# Patient Record
Sex: MALE | Race: WHITE | NOT HISPANIC OR LATINO | Employment: FULL TIME | URBAN - METROPOLITAN AREA
[De-identification: names, ages, dates, MRNs, and addresses within clinical notes are randomized per-mention and may not be internally consistent; named-entity substitution may affect disease eponyms.]

---

## 2017-04-11 ENCOUNTER — OFFICE VISIT (OUTPATIENT)
Dept: FAMILY MEDICINE | Facility: CLINIC | Age: 27
End: 2017-04-11

## 2017-04-11 VITALS
DIASTOLIC BLOOD PRESSURE: 71 MMHG | WEIGHT: 160 LBS | TEMPERATURE: 98.2 F | HEIGHT: 69 IN | BODY MASS INDEX: 23.7 KG/M2 | HEART RATE: 76 BPM | SYSTOLIC BLOOD PRESSURE: 129 MMHG | OXYGEN SATURATION: 98 %

## 2017-04-11 DIAGNOSIS — J30.1 SEASONAL ALLERGIC RHINITIS DUE TO POLLEN: Primary | ICD-10-CM

## 2017-04-11 DIAGNOSIS — J45.990 EXERCISE-INDUCED ASTHMA: ICD-10-CM

## 2017-04-11 PROBLEM — F19.11 SUBSTANCE ABUSE IN REMISSION (H): Status: ACTIVE | Noted: 2017-04-11

## 2017-04-11 RX ORDER — FLUTICASONE PROPIONATE 50 MCG
2 SPRAY, SUSPENSION (ML) NASAL DAILY
Qty: 16 G | Refills: 1 | Status: SHIPPED | OUTPATIENT
Start: 2017-04-11

## 2017-04-11 RX ORDER — ALBUTEROL SULFATE 90 UG/1
2 AEROSOL, METERED RESPIRATORY (INHALATION) EVERY 4 HOURS PRN
Qty: 1 INHALER | Refills: 1
Start: 2017-04-11 | End: 2024-04-01

## 2017-04-11 RX ORDER — LORATADINE 10 MG/1
10 TABLET ORAL DAILY
Qty: 90 TABLET | Refills: 1 | Status: SHIPPED | OUTPATIENT
Start: 2017-04-11 | End: 2024-04-01

## 2017-04-11 ASSESSMENT — ENCOUNTER SYMPTOMS
FATIGUE: 1
ACTIVITY CHANGE: 0
CHILLS: 0
EYE DISCHARGE: 0
APPETITE CHANGE: 0
SHORTNESS OF BREATH: 0
EYE ITCHING: 1
COUGH: 1
FEVER: 0
SORE THROAT: 1
RHINORRHEA: 1
SINUS PRESSURE: 1
CHEST TIGHTNESS: 0

## 2017-04-11 ASSESSMENT — PAIN SCALES - GENERAL: PAINLEVEL: NO PAIN (0)

## 2017-04-11 NOTE — PATIENT INSTRUCTIONS
Understanding Nasal Allergies  Nasal allergies (also called allergic rhinitis) are a common health problem. They may be seasonal.This means they cause symptoms only at certain times of the year. Or they may be perennial.This means they cause symptoms all year long. Other health problems, such as asthma, often occur along with allergies as well.    What Is an allergic reaction?  An allergy is a reaction to a substance called an allergen. Common allergens include:    Wind-borne pollen    Mold    Dust mites    Furry and feathered animals    Cockroaches  Normally, allergens are harmless. But when a person has allergies, their body thinks they are harmful. Their body then attacks allergens with antibodies. Antibodies are attached to special cells called mast cells. Allergens stick to the antibodies. This makes the mast cells release histamine and other chemicals. This is an allergic reaction. The chemicals irritate nearby nasal tissue. This causes nasal allergy symptoms.  Common nasal allergy symptoms  Allergies can cause nasal tissue to swell. This makes the air passages smaller. The nose may feel stuffed up. The nose may also make extra mucus, which can plug the nasal passages or drip out of the nose. Mucus can drip down the back of the throat (postnasal drip) as well. Sinus tissue can swell. This may cause pain and headache. Common allergy symptoms include:    Runny nose with clear, watery discharge    Stuffy nose (nasal congestion)    Drainage down your throat (postnasal drip)    Sneezing    Red, watery eyes    Itchy nose, eyes, ears, and throat    Plugged-up ears (ear congestion)    Sore throat    Coughing    Sinus pain and swelling    Headache  It may not be allergies  Other health problems can cause symptoms like those of nasal allergies. These include:    Nonallergic rhinitis and viruses such as colds    Irritants and pollutants, such as strong odors or smoke    Certain medications    Changes in the weather    Treatment  Your health care provider will evaluate you to find the cause of your symptoms then recommend treatment. You may also be referred to an allergist.     3339-5401 [employer]. [address] [place] [address], [place] [address]. All rights reserved. This information is not intended as a substitute for professional medical care. Always follow your healthcare professional's instructions.        Controlling Asthma Triggers: Allergens  For many people with lung problems such as asthma or COPD, inhaling allergens leads to inflamed airways. Allergens also cause other types of reactions in some people. For example, a runny nose, itchy, watery eyes, or a skin rash. Do your best to avoid allergens that trigger symptoms. The tips below can help to lessen any reaction you may have to certain allergens.     Wash bedding in hot water (130 F) each week.    Dust Mites  Dust mites are tiny bugs too small to see or feel. But they can be a major trigger for allergy and asthma symptoms. Dust mites live in mattresses, bedding, carpets, curtains, and indoor dust. They thrive in warm, moist environments.    Wash bedding in hot water (130 F) each week. This kills the dust mites.    Cover mattress and pillows with special dust-mite-proof (hypoallergenic) cases.    Don t use upholstered furniture (like sofas or chairs) in the bedroom.    Use allergy-proof filters for air conditioners and furnaces. Follow  instructions for maintaining and replacing filters.    If you can, replace [id number] carpets with wood, tile, or linoleum floors--especially in the bedroom.  Animals  Animals with fur or feathers often produce allergens. These are shed as tiny particles called dander. Dander can float through the air or stick to carpet, clothing, and furniture.    It s best to choose a pet that doesn t have fur or feathers, such as a fish or a reptile.    Keep pets with fur or feathers out of your home. If you can t do this,  be sure to keep them out of your bedroom.    Wash your hands and clothes after handling pets.  Mold  Mold grows in damp places, such as bathrooms, basements, and closets.    Clean damp areas weekly to prevent mold growth. This includes shower stalls and sinks. You may need someone to clean these areas for you. Or, try wearing a mask.    Run an exhaust fan while bathing. Or, leave a window open in the bathroom.    Repair water leaks in or around your home.    Have someone else cut grass or rake leaves, if possible.    Don t use vaporizers, or humidifiers. These put water into the air and encourage mold growth.  Pollen  Pollen from trees, grasses, and weeds is a common allergen. (Flower pollens are generally not a problem.)    Try to learn what types of pollen affect you most. Pollen levels vary depending on the plant, the season, and the time of day.    Use air conditioning instead of opening the windows in your home or car. In the car, choose the setting to recirculate the air, so less pollen gets in.    Have someone else do yardwork, if possible.  Cockroaches  Cockroaches are a common household pest. They also produce allergens.    Keep your kitchen clean and dry. A leaky faucet or drain can attract roaches.    Remove garbage from your home daily.    Store food in tightly sealed containers. Wash dishes promptly as soon as they are used.    Use bait stations or traps to control roaches. Avoid using chemical sprays.    8899-9070 [employer]. [address] [place] [address], [place] [address]. All rights reserved. This information is not intended as a substitute for professional medical care. Always follow your healthcare professional's instructions.        Seasonal Allergy  Seasonal Allergy is also called  Hay Fever.  It may occur after a person is exposed to pollens released from grasses, weeds, trees and shrubs. This type of allergy occurs during the spring and summer when the pollen contacts the lining of the  nose, eyes, eyelids, sinuses and throat. This causes  histamine  to be released from the tissues.  Histamine causes itching and swelling.  This may produce a watery discharge from the eyes or nose. Violent sneezing, nasal congestion, post-nasal drip, itching of the eyes, nose, throat and mouth, scratchy throat, and dry cough may also occur.    Home care  Seasonal allergy cannot be cured, but symptoms can be reduced by these measures:    Avoid or reduce exposure to the allergen as much as you can:      Stay indoors on windy days of pollen season.     Keep windows and doors closed. Use air conditioning instead in your home and car. This filters the air.    Change air conditioner filters often.    Decongestant pills and sprays reduce tissue swelling and watery discharge. Overuse of nasal decongestant sprays may make symptoms worse. Do not use these more often than recommended. Sometimes you can experience a rebound effect (symptoms worsen), when stopping them. Talk to your healthcare provider or pharmacist about these medicines before taking them, especially if you have hypertension or heart problems.     Antihistamines block the release of histamine during the allergic response. They work better when taken BEFORE symptoms develop. Unless a prescription antihistamine was prescribed, you can take over-the-counter antihistamines that do not cause drowsiness.  Ask your pharmacist for suggestions.    Steroid nasal sprays or oral steroids may also be prescribed for more severe symptoms. These help to reduce the local inflammation that can add to the allergic response.    If you have asthma, pollen season may make your asthma symptoms worse. It is important that you use your asthma medicines as directed during this time to prevent or treat attacks. Some persons with ASTHMA have asthma symptoms that get worse when they take antihistamines. This is due to the drying effect on the lungs. If you notice this, stop the  antihistamines, drink extra fluids and notify your doctor.    If you have sinus congestion or drainage, a saline nasal rinse may give relief. A saline nasal rinse lessens the swelling and clears excess mucus. This allows sinuses to drain. Prepackaged kits are sold at most drug stores. These contain pre-mixed salt packets and an irrigation device.  Follow-up care  Follow up with your healthcare provider or as directed. If you have been referred to a specialist, make an appointment promptly.  When to seek medical advice  Call your healthcare provider for any of the following:    Facial, ear or sinus pain; colored drainage from the nose    Severe headache    You have asthma and your asthma symptoms do not respond to the usual doses of your medicine    Cough with lots of colored sputum (mucus)    Fever of 100.4 F (38 C) or higher or as directed by the healthcare provider  Call 911 if any of these occur:    Trouble breathing or swallowing, wheezing    Hoarse voice or trouble speaking    Confusion    Very drowsy or trouble awakening    Fainting or loss of consciousness    Rapid heart rate    Low blood pressure    Feeling of doom    Nausea, vomiting, abdominal pain, diarrhea    Vomiting blood, or large amounts of blood in stool    Seizure    [id number] [employer]. [address] [place], [place] [id number]. All rights reserved. This information is not intended as a substitute for professional medical care. Always follow your healthcare professional's instructions.        Controlling Allergens: Pollen     Keep windows closed, especially when pollen counts are high, and use air conditioning instead.   Constant exposure to allergens means constant allergy symptoms. That s why it's important to control or avoid the allergens that cause your symptoms. If you are allergic to pollen, the tips below may help. The more you do to keep  from allergens, the better you ll feel.  Pollen allergy  The pollen that causes  allergies is usually not the pollen carried from plant to plant by insects such as butterflies and bees. The types of pollen that most commonly cause allergies are made by plants (trees, grasses, and weeds) that do not have flowers. These plants make small, light, dry pollen granules that are blown from plant to plant by the wind.  Controlling pollen  Below are some tips to help you limit your exposure to pollen:    Check pollen counts and avoid spending a lot of time outdoors when counts are high. Pollen counts tend to be higher during warm, dry weather. They also tend to be higher during early morning and late afternoon hours. In some areas, daily pollen counts are reported in the paper and on the radio.    After spending time outdoors, bathe or shower, wash your hair, and change your clothes.    Stay indoors as much as you can on windy days.    Keep windows closed and air conditioning on, if possible, in your car and your home.    Have someone else do gardening, yardwork, or other outdoor chores. Masks are available if you have to spend time outdoors.    When your allergies are at their worst each year, try getting away to a place where your allergies won t bother you as much. This might be a time to try to plan a vacation or visit a friend or relative.    Talk with your health care provider about medications that can help. And, whether or not you might benefit from seeing an allergist.  Pollen allergy is seasonal  People have pollen only when the pollen to which they are allergic is in the air. Each plant pollinates more or less the same from year to year. Exactly when a plant starts to pollinate seems to depend on geographical location--rather than on the weather.     [id number] [employer]. [address] [place] [id number]. All rights reserved. This information is not intended as a substitute for professional medical care. Always follow your healthcare professional's instructions.

## 2017-04-11 NOTE — PROGRESS NOTES
"      HPI:       Luis Fernando Martinez is a 27 year old who presents for the following  Patient presents with:  Nasal Congestion: & Chest congestion x 1 week. Cough, no sore throat. Pain Scale 0/10.    Cheo is a 27 year old male who is new to the clinic and new to this writer. He reports he moved next door to the clinic at [facility], he is participating in treatment, his clean date is 10/3/16 from substance use.     He comes to clinic today because over the last 2 weeks he's felt congested, and he coughs when he wakes up. He has a pounding headache.  He has clear nasal congestion and a post nasal drip when he lays down. When he coughs there is sometimes mucous, he reports a hx of exercise induced asthma that he uses albuterol for prior to running. He has a headache at the top of his head. He has some sinus pressure. No chills or fevers. He has a hx of allergies. He feels tired.       He feels down since being sick. He's been coughing x 2 weeks. Prior to his symptoms he felt energized, motivated.     Hx of asthma, exercise induced asthma. Feels his allergies may be making his asthmatic cough worse. He is allergic to cats and dogs.       Problem, Medication and Allergy Lists were reviewed and are current.  Patient is a new patient to this clinic.         Review of Systems:   Review of Systems   Constitutional: Positive for fatigue. Negative for activity change, appetite change, chills and fever.   HENT: Positive for congestion, postnasal drip, rhinorrhea, sinus pressure, sneezing and sore throat. Negative for ear pain and nosebleeds.    Eyes: Positive for itching. Negative for discharge.   Respiratory: Positive for cough. Negative for chest tightness and shortness of breath.    Cardiovascular: Negative for chest pain.             Physical Exam:   Patient Vitals for the past 24 hrs:   BP Temp Temp src Pulse SpO2 Height Weight   04/11/17 1402 129/71 98.2  F (36.8  C) Oral 76 98 % 5' 9\" (175.3 cm) 160 lb (72.6 kg)     Body " mass index is 23.63 kg/(m^2).  Vitals were reviewed and were normal     Physical Exam   Constitutional: He is oriented to person, place, and time. Vital signs are normal. He appears well-developed and well-nourished. He is active. He does not appear ill.   HENT:   Head: Normocephalic.   Right Ear: Tympanic membrane, external ear and ear canal normal.   Left Ear: Tympanic membrane, external ear and ear canal normal.   Nose: Mucosal edema and rhinorrhea present. No sinus tenderness. Right sinus exhibits no maxillary sinus tenderness and no frontal sinus tenderness. Left sinus exhibits no maxillary sinus tenderness and no frontal sinus tenderness.   Mouth/Throat: Uvula is midline and mucous membranes are normal. Posterior oropharyngeal erythema present. No oropharyngeal exudate or posterior oropharyngeal edema.   Eyes: EOM are normal. Pupils are equal, round, and reactive to light. Right conjunctiva is injected. Left conjunctiva is injected.   Neck: Neck supple.   Cardiovascular: Normal rate, regular rhythm, normal heart sounds and intact distal pulses.    Pulmonary/Chest: Effort normal and breath sounds normal. No respiratory distress.   Lymphadenopathy:     He has no cervical adenopathy.   Neurological: He is alert and oriented to person, place, and time.   Skin: Skin is warm and dry. He is not diaphoretic.   Psychiatric: He has a normal mood and affect.         Results:      Results from the last 24 hoursNo results found for this or any previous visit (from the past 24 hour(s)).  Assessment and Plan     Luis Fernando was seen today for nasal congestion.    Diagnoses and all orders for this visit:    Seasonal allergic rhinitis due to pollen  -     loratadine (CLARITIN) 10 MG tablet; Take 1 tablet (10 mg) by mouth daily  -     fluticasone (FLONASE) 50 MCG/ACT spray; Spray 2 sprays into both nostrils daily    Exercise-induced asthma  He denies needing a refill on his proair inhaler. If no improvement in his allergies, update  clinic and consider starting singulair. Educated patient to start treatment plan noted and to continue to monitor, reviewed handout below, all questions answered. Call clinic if worsening or no improvement.     There are no discontinued medications.  Options for treatment and follow-up care were reviewed with the patient. Luis Fernando Martinez  engaged in the decision making process and verbalized understanding of the options discussed and agreed with the final plan.    Catrina Schumacher NP    Patient Instructions       Understanding Nasal Allergies  Nasal allergies (also called allergic rhinitis) are a common health problem. They may be seasonal.This means they cause symptoms only at certain times of the year. Or they may be perennial.This means they cause symptoms all year long. Other health problems, such as asthma, often occur along with allergies as well.    What Is an allergic reaction?  An allergy is a reaction to a substance called an allergen. Common allergens include:    Wind-borne pollen    Mold    Dust mites    Furry and feathered animals    Cockroaches  Normally, allergens are harmless. But when a person has allergies, their body thinks they are harmful. Their body then attacks allergens with antibodies. Antibodies are attached to special cells called mast cells. Allergens stick to the antibodies. This makes the mast cells release histamine and other chemicals. This is an allergic reaction. The chemicals irritate nearby nasal tissue. This causes nasal allergy symptoms.  Common nasal allergy symptoms  Allergies can cause nasal tissue to swell. This makes the air passages smaller. The nose may feel stuffed up. The nose may also make extra mucus, which can plug the nasal passages or drip out of the nose. Mucus can drip down the back of the throat (postnasal drip) as well. Sinus tissue can swell. This may cause pain and headache. Common allergy symptoms include:    Runny nose with clear, watery  discharge    Stuffy nose (nasal congestion)    Drainage down your throat (postnasal drip)    Sneezing    Red, watery eyes    Itchy nose, eyes, ears, and throat    Plugged-up ears (ear congestion)    Sore throat    Coughing    Sinus pain and swelling    Headache  It may not be allergies  Other health problems can cause symptoms like those of nasal allergies. These include:    Nonallergic rhinitis and viruses such as colds    Irritants and pollutants, such as strong odors or smoke    Certain medications    Changes in the weather   Treatment  Your health care provider will evaluate you to find the cause of your symptoms then recommend treatment. You may also be referred to an allergist.     4190-9233 [employer]. [address] [place] [address], [place] [address]. All rights reserved. This information is not intended as a substitute for professional medical care. Always follow your healthcare professional's instructions.        Controlling Asthma Triggers: Allergens  For many people with lung problems such as asthma or COPD, inhaling allergens leads to inflamed airways. Allergens also cause other types of reactions in some people. For example, a runny nose, itchy, watery eyes, or a skin rash. Do your best to avoid allergens that trigger symptoms. The tips below can help to lessen any reaction you may have to certain allergens.     Wash bedding in hot water (130 F) each week.    Dust Mites  Dust mites are tiny bugs too small to see or feel. But they can be a major trigger for allergy and asthma symptoms. Dust mites live in mattresses, bedding, carpets, curtains, and indoor dust. They thrive in warm, moist environments.    Wash bedding in hot water (130 F) each week. This kills the dust mites.    Cover mattress and pillows with special dust-mite-proof (hypoallergenic) cases.    Don t use upholstered furniture (like sofas or chairs) in the bedroom.    Use allergy-proof filters for air conditioners and furnaces.  Follow  instructions for maintaining and replacing filters.    If you can, replace [id number] carpets with wood, tile, or linoleum floors--especially in the bedroom.  Animals  Animals with fur or feathers often produce allergens. These are shed as tiny particles called dander. Dander can float through the air or stick to carpet, clothing, and furniture.    It s best to choose a pet that doesn t have fur or feathers, such as a fish or a reptile.    Keep pets with fur or feathers out of your home. If you can t do this, be sure to keep them out of your bedroom.    Wash your hands and clothes after handling pets.  Mold  Mold grows in damp places, such as bathrooms, basements, and closets.    Clean damp areas weekly to prevent mold growth. This includes shower stalls and sinks. You may need someone to clean these areas for you. Or, try wearing a mask.    Run an exhaust fan while bathing. Or, leave a window open in the bathroom.    Repair water leaks in or around your home.    Have someone else cut grass or rake leaves, if possible.    Don t use vaporizers, or humidifiers. These put water into the air and encourage mold growth.  Pollen  Pollen from trees, grasses, and weeds is a common allergen. (Flower pollens are generally not a problem.)    Try to learn what types of pollen affect you most. Pollen levels vary depending on the plant, the season, and the time of day.    Use air conditioning instead of opening the windows in your home or car. In the car, choose the setting to recirculate the air, so less pollen gets in.    Have someone else do yardwork, if possible.  Cockroaches  Cockroaches are a common household pest. They also produce allergens.    Keep your kitchen clean and dry. A leaky faucet or drain can attract roaches.    Remove garbage from your home daily.    Store food in tightly sealed containers. Wash dishes promptly as soon as they are used.    Use bait stations or traps to control roaches.  Avoid using chemical sprays.    3098-5067 [employer]. [address] [place] [address], [place] [address]. All rights reserved. This information is not intended as a substitute for professional medical care. Always follow your healthcare professional's instructions.        Seasonal Allergy  Seasonal Allergy is also called  Hay Fever.  It may occur after a person is exposed to pollens released from grasses, weeds, trees and shrubs. This type of allergy occurs during the spring and summer when the pollen contacts the lining of the nose, eyes, eyelids, sinuses and throat. This causes  histamine  to be released from the tissues.  Histamine causes itching and swelling.  This may produce a watery discharge from the eyes or nose. Violent sneezing, nasal congestion, post-nasal drip, itching of the eyes, nose, throat and mouth, scratchy throat, and dry cough may also occur.    Home care  Seasonal allergy cannot be cured, but symptoms can be reduced by these measures:    Avoid or reduce exposure to the allergen as much as you can:      Stay indoors on windy days of pollen season.     Keep windows and doors closed. Use air conditioning instead in your home and car. This filters the air.    Change air conditioner filters often.    Decongestant pills and sprays reduce tissue swelling and watery discharge. Overuse of nasal decongestant sprays may make symptoms worse. Do not use these more often than recommended. Sometimes you can experience a rebound effect (symptoms worsen), when stopping them. Talk to your healthcare provider or pharmacist about these medicines before taking them, especially if you have hypertension or heart problems.     Antihistamines block the release of histamine during the allergic response. They work better when taken BEFORE symptoms develop. Unless a prescription antihistamine was prescribed, you can take over-the-counter antihistamines that do not cause drowsiness.  Ask your pharmacist for  suggestions.    Steroid nasal sprays or oral steroids may also be prescribed for more severe symptoms. These help to reduce the local inflammation that can add to the allergic response.    If you have asthma, pollen season may make your asthma symptoms worse. It is important that you use your asthma medicines as directed during this time to prevent or treat attacks. Some persons with ASTHMA have asthma symptoms that get worse when they take antihistamines. This is due to the drying effect on the lungs. If you notice this, stop the antihistamines, drink extra fluids and notify your doctor.    If you have sinus congestion or drainage, a saline nasal rinse may give relief. A saline nasal rinse lessens the swelling and clears excess mucus. This allows sinuses to drain. Prepackaged kits are sold at most drug stores. These contain pre-mixed salt packets and an irrigation device.  Follow-up care  Follow up with your healthcare provider or as directed. If you have been referred to a specialist, make an appointment promptly.  When to seek medical advice  Call your healthcare provider for any of the following:    Facial, ear or sinus pain; colored drainage from the nose    Severe headache    You have asthma and your asthma symptoms do not respond to the usual doses of your medicine    Cough with lots of colored sputum (mucus)    Fever of 100.4 F (38 C) or higher or as directed by the healthcare provider  Call 911 if any of these occur:    Trouble breathing or swallowing, wheezing    Hoarse voice or trouble speaking    Confusion    Very drowsy or trouble awakening    Fainting or loss of consciousness    Rapid heart rate    Low blood pressure    Feeling of doom    Nausea, vomiting, abdominal pain, diarrhea    Vomiting blood, or large amounts of blood in stool    Seizure    [id number] [employer]. [address] [place] [address], [place] [address]. All rights reserved. This information is not intended as a substitute for  professional medical care. Always follow your healthcare professional's instructions.        Controlling Allergens: Pollen     Keep windows closed, especially when pollen counts are high, and use air conditioning instead.   Constant exposure to allergens means constant allergy symptoms. That s why it's important to control or avoid the allergens that cause your symptoms. If you are allergic to pollen, the tips below may help. The more you do to keep  from allergens, the better you ll feel.  Pollen allergy  The pollen that causes allergies is usually not the pollen carried from plant to plant by insects such as butterflies and bees. The types of pollen that most commonly cause allergies are made by plants (trees, grasses, and weeds) that do not have flowers. These plants make small, light, dry pollen granules that are blown from plant to plant by the wind.  Controlling pollen  Below are some tips to help you limit your exposure to pollen:    Check pollen counts and avoid spending a lot of time outdoors when counts are high. Pollen counts tend to be higher during warm, dry weather. They also tend to be higher during early morning and late afternoon hours. In some areas, daily pollen counts are reported in the paper and on the radio.    After spending time outdoors, bathe or shower, wash your hair, and change your clothes.    Stay indoors as much as you can on windy days.    Keep windows closed and air conditioning on, if possible, in your car and your home.    Have someone else do gardening, yardwork, or other outdoor chores. Masks are available if you have to spend time outdoors.    When your allergies are at their worst each year, try getting away to a place where your allergies won t bother you as much. This might be a time to try to plan a vacation or visit a friend or relative.    Talk with your health care provider about medications that can help. And, whether or not you might benefit from seeing an  allergist.  Pollen allergy is seasonal  People have pollen only when the pollen to which they are allergic is in the air. Each plant pollinates more or less the same from year to year. Exactly when a plant starts to pollinate seems to depend on geographical location--rather than on the weather.     [id number] [employer]. [address] [place] [address]. All rights reserved. This information is not intended as a substitute for professional medical care. Always follow your healthcare professional's instructions.

## 2017-04-11 NOTE — MR AVS SNAPSHOT
After Visit Summary   4/11/2017    Luis Fernando Martinez    MRN: [id number]           Patient Information     Date Of Birth          1990        Visit Information        Provider Department      4/11/2017 2:00 PM Catrina Schumacher NP [facility]        Today's Diagnoses     Seasonal allergic rhinitis due to pollen    -  1    Exercise-induced asthma          Care Instructions      Understanding Nasal Allergies  Nasal allergies (also called allergic rhinitis) are a common health problem. They may be seasonal.This means they cause symptoms only at certain times of the year. Or they may be perennial.This means they cause symptoms all year long. Other health problems, such as asthma, often occur along with allergies as well.    What Is an allergic reaction?  An allergy is a reaction to a substance called an allergen. Common allergens include:    Wind-borne pollen    Mold    Dust mites    Furry and feathered animals    Cockroaches  Normally, allergens are harmless. But when a person has allergies, their body thinks they are harmful. Their body then attacks allergens with antibodies. Antibodies are attached to special cells called mast cells. Allergens stick to the antibodies. This makes the mast cells release histamine and other chemicals. This is an allergic reaction. The chemicals irritate nearby nasal tissue. This causes nasal allergy symptoms.  Common nasal allergy symptoms  Allergies can cause nasal tissue to swell. This makes the air passages smaller. The nose may feel stuffed up. The nose may also make extra mucus, which can plug the nasal passages or drip out of the nose. Mucus can drip down the back of the throat (postnasal drip) as well. Sinus tissue can swell. This may cause pain and headache. Common allergy symptoms include:    Runny nose with clear, watery discharge    Stuffy nose (nasal congestion)    Drainage down your throat (postnasal drip)    Sneezing    Red, watery eyes    Itchy  nose, eyes, ears, and throat    Plugged-up ears (ear congestion)    Sore throat    Coughing    Sinus pain and swelling    Headache  It may not be allergies  Other health problems can cause symptoms like those of nasal allergies. These include:    Nonallergic rhinitis and viruses such as colds    Irritants and pollutants, such as strong odors or smoke    Certain medications    Changes in the weather   Treatment  Your health care provider will evaluate you to find the cause of your symptoms then recommend treatment. You may also be referred to an allergist.     8402-4337 [employer]. [address] [place] [address], [place] [address]. All rights reserved. This information is not intended as a substitute for professional medical care. Always follow your healthcare professional's instructions.        Controlling Asthma Triggers: Allergens  For many people with lung problems such as asthma or COPD, inhaling allergens leads to inflamed airways. Allergens also cause other types of reactions in some people. For example, a runny nose, itchy, watery eyes, or a skin rash. Do your best to avoid allergens that trigger symptoms. The tips below can help to lessen any reaction you may have to certain allergens.     Wash bedding in hot water (130 F) each week.    Dust Mites  Dust mites are tiny bugs too small to see or feel. But they can be a major trigger for allergy and asthma symptoms. Dust mites live in mattresses, bedding, carpets, curtains, and indoor dust. They thrive in warm, moist environments.    Wash bedding in hot water (130 F) each week. This kills the dust mites.    Cover mattress and pillows with special dust-mite-proof (hypoallergenic) cases.    Don t use upholstered furniture (like sofas or chairs) in the bedroom.    Use allergy-proof filters for air conditioners and furnaces. Follow  instructions for maintaining and replacing filters.    If you can, replace [id number] carpets with wood, tile, or  linoleum floors--especially in the bedroom.  Animals  Animals with fur or feathers often produce allergens. These are shed as tiny particles called dander. Dander can float through the air or stick to carpet, clothing, and furniture.    It s best to choose a pet that doesn t have fur or feathers, such as a fish or a reptile.    Keep pets with fur or feathers out of your home. If you can t do this, be sure to keep them out of your bedroom.    Wash your hands and clothes after handling pets.  Mold  Mold grows in damp places, such as bathrooms, basements, and closets.    Clean damp areas weekly to prevent mold growth. This includes shower stalls and sinks. You may need someone to clean these areas for you. Or, try wearing a mask.    Run an exhaust fan while bathing. Or, leave a window open in the bathroom.    Repair water leaks in or around your home.    Have someone else cut grass or rake leaves, if possible.    Don t use vaporizers, or humidifiers. These put water into the air and encourage mold growth.  Pollen  Pollen from trees, grasses, and weeds is a common allergen. (Flower pollens are generally not a problem.)    Try to learn what types of pollen affect you most. Pollen levels vary depending on the plant, the season, and the time of day.    Use air conditioning instead of opening the windows in your home or car. In the car, choose the setting to recirculate the air, so less pollen gets in.    Have someone else do yardwork, if possible.  Cockroaches  Cockroaches are a common household pest. They also produce allergens.    Keep your kitchen clean and dry. A leaky faucet or drain can attract roaches.    Remove garbage from your home daily.    Store food in tightly sealed containers. Wash dishes promptly as soon as they are used.    Use bait stations or traps to control roaches. Avoid using chemical sprays.    3100-0332 [employer]. [address] [place] [address], [place] [address]. All rights reserved. This  information is not intended as a substitute for professional medical care. Always follow your healthcare professional's instructions.        Seasonal Allergy  Seasonal Allergy is also called  Hay Fever.  It may occur after a person is exposed to pollens released from grasses, weeds, trees and shrubs. This type of allergy occurs during the spring and summer when the pollen contacts the lining of the nose, eyes, eyelids, sinuses and throat. This causes  histamine  to be released from the tissues.  Histamine causes itching and swelling.  This may produce a watery discharge from the eyes or nose. Violent sneezing, nasal congestion, post-nasal drip, itching of the eyes, nose, throat and mouth, scratchy throat, and dry cough may also occur.    Home care  Seasonal allergy cannot be cured, but symptoms can be reduced by these measures:    Avoid or reduce exposure to the allergen as much as you can:      Stay indoors on windy days of pollen season.     Keep windows and doors closed. Use air conditioning instead in your home and car. This filters the air.    Change air conditioner filters often.    Decongestant pills and sprays reduce tissue swelling and watery discharge. Overuse of nasal decongestant sprays may make symptoms worse. Do not use these more often than recommended. Sometimes you can experience a rebound effect (symptoms worsen), when stopping them. Talk to your healthcare provider or pharmacist about these medicines before taking them, especially if you have hypertension or heart problems.     Antihistamines block the release of histamine during the allergic response. They work better when taken BEFORE symptoms develop. Unless a prescription antihistamine was prescribed, you can take over-the-counter antihistamines that do not cause drowsiness.  Ask your pharmacist for suggestions.    Steroid nasal sprays or oral steroids may also be prescribed for more severe symptoms. These help to reduce the local  inflammation that can add to the allergic response.    If you have asthma, pollen season may make your asthma symptoms worse. It is important that you use your asthma medicines as directed during this time to prevent or treat attacks. Some persons with ASTHMA have asthma symptoms that get worse when they take antihistamines. This is due to the drying effect on the lungs. If you notice this, stop the antihistamines, drink extra fluids and notify your doctor.    If you have sinus congestion or drainage, a saline nasal rinse may give relief. A saline nasal rinse lessens the swelling and clears excess mucus. This allows sinuses to drain. Prepackaged kits are sold at most drug stores. These contain pre-mixed salt packets and an irrigation device.  Follow-up care  Follow up with your healthcare provider or as directed. If you have been referred to a specialist, make an appointment promptly.  When to seek medical advice  Call your healthcare provider for any of the following:    Facial, ear or sinus pain; colored drainage from the nose    Severe headache    You have asthma and your asthma symptoms do not respond to the usual doses of your medicine    Cough with lots of colored sputum (mucus)    Fever of 100.4 F (38 C) or higher or as directed by the healthcare provider  Call 911 if any of these occur:    Trouble breathing or swallowing, wheezing    Hoarse voice or trouble speaking    Confusion    Very drowsy or trouble awakening    Fainting or loss of consciousness    Rapid heart rate    Low blood pressure    Feeling of doom    Nausea, vomiting, abdominal pain, diarrhea    Vomiting blood, or large amounts of blood in stool    Seizure    2408-3938 [employer]. [address] [place] [address], [place] [address]. All rights reserved. This information is not intended as a substitute for professional medical care. Always follow your healthcare professional's instructions.        Controlling Allergens: Pollen     Keep  windows closed, especially when pollen counts are high, and use air conditioning instead.   Constant exposure to allergens means constant allergy symptoms. That s why it's important to control or avoid the allergens that cause your symptoms. If you are allergic to pollen, the tips below may help. The more you do to keep  from allergens, the better you ll feel.  Pollen allergy  The pollen that causes allergies is usually not the pollen carried from plant to plant by insects such as butterflies and bees. The types of pollen that most commonly cause allergies are made by plants (trees, grasses, and weeds) that do not have flowers. These plants make small, light, dry pollen granules that are blown from plant to plant by the wind.  Controlling pollen  Below are some tips to help you limit your exposure to pollen:    Check pollen counts and avoid spending a lot of time outdoors when counts are high. Pollen counts tend to be higher during warm, dry weather. They also tend to be higher during early morning and late afternoon hours. In some areas, daily pollen counts are reported in the paper and on the radio.    After spending time outdoors, bathe or shower, wash your hair, and change your clothes.    Stay indoors as much as you can on windy days.    Keep windows closed and air conditioning on, if possible, in your car and your home.    Have someone else do gardening, yardwork, or other outdoor chores. Masks are available if you have to spend time outdoors.    When your allergies are at their worst each year, try getting away to a place where your allergies won t bother you as much. This might be a time to try to plan a vacation or visit a friend or relative.    Talk with your health care provider about medications that can help. And, whether or not you might benefit from seeing an allergist.  Pollen allergy is seasonal  People have pollen only when the pollen to which they are allergic is in the air. Each plant pollinates  more or less the same from year to year. Exactly when a plant starts to pollinate seems to depend on geographical location--rather than on the weather.     3516-3719 [employer]. [address] [place] [address], [place] [address]. All rights reserved. This information is not intended as a substitute for professional medical care. Always follow your healthcare professional's instructions.              Follow-ups after your visit        Who to contact     Please call your clinic at [phone] to:    Ask questions about your health    Make or cancel appointments    Discuss your medicines    Learn about your test results    Speak to your doctor   If you have compliments or concerns about an experience at your clinic, or if you wish to file a complaint, please contact [facility] Patient Relations at [phone] or email us at [email or web address]         Additional Information About Your Visit        [employer] Information     [employer] is an electronic gateway that provides easy, online access to your medical records. With [employer], you can request a clinic appointment, read your test results, renew a prescription or communicate with your care team.     To sign up for [employer] visit the website at [email or web address]   You will be asked to enter the access code listed below, as well as some personal information. Please follow the directions to create your username and password.     Your access code is: [email or web address]  Expires: 7/10/2017  2:21 PM     Your access code will  in 90 days. If you need help or a new code, please contact your [facility] or call [phone] for assistance.        Care EveryWhere ID     This is your Care EveryWhere ID. This could be used by other organizations to access your [place] medical records  [id number]        Your Vitals Were     Pulse Temperature Height Pulse Oximetry BMI (Body Mass Index)       76 98.2  " F (36.8  C) (Oral) 5' 9\" (175.3 cm) 98% 23.63 kg/m2        Blood Pressure from Last 3 Encounters:   04/11/17 129/71    Weight from Last 3 Encounters:   04/11/17 160 lb (72.6 kg)              Today, you had the following     No orders found for display         Today's Medication Changes          These changes are accurate as of: 4/11/17  2:21 PM.  If you have any questions, ask your nurse or doctor.               Start taking these medicines.        Dose/Directions    fluticasone 50 MCG/ACT spray   Commonly known as:  FLONASE   Used for:  Seasonal allergic rhinitis due to pollen   Started by:  Catrina Schumacher NP        Dose:  2 spray   Spray 2 sprays into both nostrils daily   Quantity:  16 g   Refills:  1       loratadine 10 MG tablet   Commonly known as:  CLARITIN   Used for:  Seasonal allergic rhinitis due to pollen   Started by:  Catrina Schumacher NP        Dose:  10 mg   Take 1 tablet (10 mg) by mouth daily   Quantity:  90 tablet   Refills:  1            Where to get your medicines      These medications were sent to [facility]/pharmacy #[id number] - [place], [place] - [address], [place] [address]     Phone:  [phone]     fluticasone 50 MCG/ACT spray    loratadine 10 MG tablet                Primary Care Provider    None Specified       No primary provider on file.        Thank you!     Thank you for choosing [facility]  for your care. Our goal is always to provide you with excellent care. Hearing back from our patients is one way we can continue to improve our services. Please take a few minutes to complete the written survey that you may receive in the mail after your visit with us. Thank you!             Your Updated Medication List - Protect others around you: Learn how to safely use, store and throw away your medicines at www.disposemymeds.org.          This list is accurate as of: 4/11/17  2:21 PM.  Always use your most recent med list.                   Brand Name " Dispense Instructions for use    fluticasone 50 MCG/ACT spray    FLONASE    16 g    Spray 2 sprays into both nostrils daily       IBUPROFEN PO      Take 600 mg by mouth 2 times daily as needed for moderate pain       loratadine 10 MG tablet    CLARITIN    90 tablet    Take 1 tablet (10 mg) by mouth daily

## 2017-04-11 NOTE — NURSING NOTE
"27 year old  Chief Complaint   Patient presents with     Nasal Congestion     & Chest congestion x 1 week. Cough, no sore throat. Pain Scale 0/10.       Blood pressure 129/71, pulse 76, temperature 98.2  F (36.8  C), temperature source Oral, height 5' 9\" (175.3 cm), weight 160 lb (72.6 kg), SpO2 98 %. Body mass index is 23.63 kg/(m^2).  BP completed using cuff size: regular    There is no problem list on file for this patient.      Wt Readings from Last 2 Encounters:   04/11/17 160 lb (72.6 kg)     BP Readings from Last 3 Encounters:   04/11/17 129/71       Current Outpatient Prescriptions   Medication     IBUPROFEN PO     No current facility-administered medications for this visit.        Social History   Substance Use Topics     Smoking status: Current Every Day Smoker     Smokeless tobacco: Not on file     Alcohol use Not on file       Health Maintenance Due   Topic Date Due     HPV IMMUNIZATION (1 of 3 - Male 3 Dose Series) 01/05/2003     TETANUS IMMUNIZATION (SYSTEM ASSIGNED)  01/05/2010       No results found for: PAP    No flowsheet data found.    No flowsheet data found.    No flowsheet data found.    No flowsheet data found.    Gabbi Boo, [facility]  April 11, 2017 2:04 PM  "

## 2018-04-05 ENCOUNTER — OFFICE VISIT (OUTPATIENT)
Dept: FAMILY MEDICINE | Facility: CLINIC | Age: 28
End: 2018-04-05
Payer: COMMERCIAL

## 2018-04-05 VITALS
RESPIRATION RATE: 14 BRPM | OXYGEN SATURATION: 97 % | WEIGHT: 159.4 LBS | TEMPERATURE: 98.2 F | HEART RATE: 81 BPM | SYSTOLIC BLOOD PRESSURE: 114 MMHG | BODY MASS INDEX: 23.61 KG/M2 | HEIGHT: 69 IN | DIASTOLIC BLOOD PRESSURE: 72 MMHG

## 2018-04-05 DIAGNOSIS — M54.50 ACUTE MIDLINE LOW BACK PAIN WITHOUT SCIATICA: Primary | ICD-10-CM

## 2018-04-05 RX ORDER — IBUPROFEN 600 MG/1
600 TABLET, FILM COATED ORAL EVERY 6 HOURS PRN
Qty: 60 TABLET | Refills: 1 | Status: SHIPPED | OUTPATIENT
Start: 2018-04-05 | End: 2021-05-29

## 2018-04-05 ASSESSMENT — PATIENT HEALTH QUESTIONNAIRE - PHQ9: 5. POOR APPETITE OR OVEREATING: NOT AT ALL

## 2018-04-05 ASSESSMENT — ANXIETY QUESTIONNAIRES
3. WORRYING TOO MUCH ABOUT DIFFERENT THINGS: SEVERAL DAYS
IF YOU CHECKED OFF ANY PROBLEMS ON THIS QUESTIONNAIRE, HOW DIFFICULT HAVE THESE PROBLEMS MADE IT FOR YOU TO DO YOUR WORK, TAKE CARE OF THINGS AT HOME, OR GET ALONG WITH OTHER PEOPLE: NOT DIFFICULT AT ALL
7. FEELING AFRAID AS IF SOMETHING AWFUL MIGHT HAPPEN: SEVERAL DAYS
6. BECOMING EASILY ANNOYED OR IRRITABLE: SEVERAL DAYS
2. NOT BEING ABLE TO STOP OR CONTROL WORRYING: NOT AT ALL
1. FEELING NERVOUS, ANXIOUS, OR ON EDGE: NOT AT ALL
5. BEING SO RESTLESS THAT IT IS HARD TO SIT STILL: NOT AT ALL
GAD7 TOTAL SCORE: 3

## 2018-04-05 NOTE — NURSING NOTE
"28 year old  Chief Complaint   Patient presents with     Back Pain     Low back pain on and off x1 wk.        Blood pressure 114/72, pulse 81, temperature 98.2  F (36.8  C), temperature source Oral, resp. rate 14, height 5' 8.7\" (174.5 cm), weight 159 lb 6.4 oz (72.3 kg), SpO2 97 %. Body mass index is 23.75 kg/(m^2).  BP completed using cuff size:    Patient Active Problem List   Diagnosis     Seasonal allergic rhinitis due to pollen     Exercise-induced asthma     Substance abuse in remission       Wt Readings from Last 2 Encounters:   04/05/18 159 lb 6.4 oz (72.3 kg)   04/11/17 160 lb (72.6 kg)     BP Readings from Last 3 Encounters:   04/05/18 114/72   04/11/17 129/71       No Known Allergies    Current Outpatient Prescriptions   Medication     IBUPROFEN PO     loratadine (CLARITIN) 10 MG tablet     fluticasone (FLONASE) 50 MCG/ACT spray     albuterol (PROAIR HFA/PROVENTIL HFA/VENTOLIN HFA) 108 (90 BASE) MCG/ACT Inhaler     No current facility-administered medications for this visit.        Social History   Substance Use Topics     Smoking status: Current Every Day Smoker     Types: Cigarettes     Smokeless tobacco: Not on file     Alcohol use Not on file       Health Maintenance Due   Topic Date Due     ASTHMA ACTION PLAN Q1 YR  01/05/1997     ASTHMA CONTROL TEST Q6 MOS  01/05/1997     TETANUS IMMUNIZATION (SYSTEM ASSIGNED)  01/05/2010       No results found for: PAP    PHQ-2 ( 1999 [employer]) 4/5/2018   Q1: Little interest or pleasure in doing things 2   Q2: Feeling down, depressed or hopeless 1   PHQ-2 Score 3       PHQ-9 SCORE 4/5/2018   Total Score 7       STACIE-7 SCORE 4/5/2018   Total Score 3       No flowsheet data found.    Laney Dahl CMA  April 5, 2018 1:22 PM    "

## 2018-04-05 NOTE — LETTER
Current Work Restrictions.    Re:Luis Fernando Juan  1990    Primary Provider No primary care provider on file.    Date of Injury: 1/5/2018    Employer: [employer]     /Phone: Rikki [phone]  Diagnosis: Low back strain      Work Related? Yes. Details: on 1/5/18 bent and lifted up a go cart, strained back.  Treatment, better for awhile, now similar pain again for the past week    The employee is ABLE to return to work today.    When the patient returns to work, the following restrictions apply until 5/5/2018:  A) Bend: Not at all (0 hours)  B) Squat: Occasionally (1-3 hours)  C) Walk/Stand: Frequently (4-8 hours)  D) Reach Above Shoulders: Frequently (4-8 hours)  E) Lift, carry, push, and pull no more than:  21-30 lbs.    Fitness for Duty:      [employer] has not been reached.    Physical therapy evaluation and treatment    Follow-Up:Follow up in 1 month.    Sloane Juan NP

## 2018-04-05 NOTE — MR AVS SNAPSHOT
"              After Visit Summary   4/5/2018    Luis Fernando Martinez    MRN: [id number]           Patient Information     Date Of Birth          1990        Visit Information        Provider Department      4/5/2018 1:00 PM Sloane Juan, NP [facility]        Today's Diagnoses     Acute midline low back pain without sciatica    -  1      Care Instructions    Ibuprofen 600 mg three times daily- alternate with Tylenol (in between) 500 mg           Follow-ups after your visit        Additional Services     PHYSICAL THERAPY REFERRAL       This therapy referral will be filtered to a centralized scheduling office at [facility] and the patient will receive a call to schedule an appointment at a [facility] location most convenient for them.      [facility] provides Physical Therapy evaluation and treatment and many specialty services across the [facility] system.  If requesting a specialty program, please choose from the list below.    If you have not heard from the scheduling office within 2 business days, please call [phone] for all locations, with the exception of [place], please call [phone] and [facility], please call [phone]  Treatment: Evaluation & Treatment  Special Instructions/Modalities: evaluate and treat for low back pain  Special Programs: None    Please be aware that coverage of these services is subject to the terms and limitations of your health insurance plan.  Call member services at your health plan with any benefit or coverage questions.      **Note to Provider:  If you are referring outside of [facility] for the therapy appointment, please list the name of the location in the \"special instructions\" above, print the referral and give to the patient to schedule the appointment.                  Who to contact     Please call your clinic at [phone] to:    Ask questions about your health    Make or cancel appointments    Discuss your " "medicines    Learn about your test results    Speak to your doctor            Additional Information About Your Visit        MyChart Information     JAZIO is an electronic gateway that provides easy, online access to your medical records. With JAZIO, you can request a clinic appointment, read your test results, renew a prescription or communicate with your care team.     To sign up for JAZIO visit the website at [email or web address]   You will be asked to enter the access code listed below, as well as some personal information. Please follow the directions to create your username and password.     Your access code is: [email or web address]  Expires: 2018  2:00 PM     Your access code will  in 90 days. If you need help or a new code, please contact your [facility] or call [phone] for assistance.        Care EveryWhere ID     This is your Care EveryWhere ID. This could be used by other organizations to access your [facility] medical records  [id number]        Your Vitals Were     Pulse Temperature Respirations Height Pulse Oximetry BMI (Body Mass Index)    81 98.2  F (36.8  C) (Oral) 14 5' 8.7\" (174.5 cm) 97% 23.75 kg/m2       Blood Pressure from Last 3 Encounters:   18 114/72   17 129/71    Weight from Last 3 Encounters:   18 159 lb 6.4 oz (72.3 kg)   17 160 lb (72.6 kg)              We Performed the Following     PHYSICAL THERAPY REFERRAL          Today's Medication Changes          These changes are accurate as of 18  2:00 PM.  If you have any questions, ask your nurse or doctor.               These medicines have changed or have updated prescriptions.        Dose/Directions    * IBUPROFEN PO   This may have changed:  Another medication with the same name was added. Make sure you understand how and when to take each.        Dose:  600 mg   Take 600 mg by mouth 2 times daily as needed for moderate pain   Refills:  0       * ibuprofen 600 " MG tablet   Commonly known as:  ADVIL/MOTRIN   This may have changed:  You were already taking a medication with the same name, and this prescription was added. Make sure you understand how and when to take each.   Used for:  Acute midline low back pain without sciatica        Dose:  600 mg   Take 1 tablet (600 mg) by mouth every 6 hours as needed for moderate pain   Quantity:  60 tablet   Refills:  1       * Notice:  This list has 2 medication(s) that are the same as other medications prescribed for you. Read the directions carefully, and ask your doctor or other care provider to review them with you.         Where to get your medicines      Some of these will need a paper prescription and others can be bought over the counter.  Ask your nurse if you have questions.     Bring a paper prescription for each of these medications     ibuprofen 600 MG tablet                Primary Care Provider    None Specified       No primary provider on file.        Equal Access to Services     JESSICA HILLMAN : Patrice Frankel, eileen mancilla, elzbieta griffiths, manjinder styles. So [facility] [phone].    ATENCIÓN: Si habla español, tiene a tejada disposición servicios gratuitos de asistencia lingüística. Llame al [phone].    We comply with applicable federal civil rights laws and [place] laws. We do not discriminate on the basis of race, color, national origin, age, disability, sex, sexual orientation, or gender identity.            Thank you!     Thank you for choosing [facility]  for your care. Our goal is always to provide you with excellent care. Hearing back from our patients is one way we can continue to improve our services. Please take a few minutes to complete the written survey that you may receive in the mail after your visit with us. Thank you!             Your Updated Medication List - Protect others around you: Learn how to safely use, store and throw away  your medicines at www.disposemymeds.org.          This list is accurate as of 4/5/18  2:00 PM.  Always use your most recent med list.                   Brand Name Dispense Instructions for use Diagnosis    albuterol 108 (90 BASE) MCG/ACT Inhaler    PROAIR HFA/PROVENTIL HFA/VENTOLIN HFA    1 Inhaler    Inhale 2 puffs into the lungs every 4 hours as needed for shortness of breath / dyspnea or wheezing    Exercise-induced asthma       fluticasone 50 MCG/ACT spray    FLONASE    16 g    Spray 2 sprays into both nostrils daily    Seasonal allergic rhinitis due to pollen       * IBUPROFEN PO      Take 600 mg by mouth 2 times daily as needed for moderate pain        * ibuprofen 600 MG tablet    ADVIL/MOTRIN    60 tablet    Take 1 tablet (600 mg) by mouth every 6 hours as needed for moderate pain    Acute midline low back pain without sciatica       loratadine 10 MG tablet    CLARITIN    90 tablet    Take 1 tablet (10 mg) by mouth daily    Seasonal allergic rhinitis due to pollen       * Notice:  This list has 2 medication(s) that are the same as other medications prescribed for you. Read the directions carefully, and ask your doctor or other care provider to review them with you.

## 2018-04-06 ASSESSMENT — ANXIETY QUESTIONNAIRES: GAD7 TOTAL SCORE: 3

## 2018-04-06 ASSESSMENT — PATIENT HEALTH QUESTIONNAIRE - PHQ9: SUM OF ALL RESPONSES TO PHQ QUESTIONS 1-9: 7

## 2018-04-16 NOTE — PROGRESS NOTES
"Luis Fernando Martinez is a 28 year old male who presents today for back pain on/off for over one week.  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  He is continuing to work but it is constantly aggravated with particular movements.  He has used Flexeril in the past when he has had discomfort that he describes as low back tightness, this is not incredibly helpful for pain, it does help him sleep.      No neurologic or red flag symptoms.    Review Of Systems   ROS: 10 point ROS neg other than the symptoms noted above in the HPI.    No past medical history on file.  No past surgical history on file.  Social History     Social History     Marital status: Single     Spouse name: N/A     Number of children: N/A     Years of education: N/A     Occupational History     Not on file.     Social History Main Topics     Smoking status: Current Every Day Smoker     Types: Cigarettes     Smokeless tobacco: Not on file     Alcohol use Not on file     Drug use: Not on file      Comment: Clean date 10/3/16     Sexual activity: Not on file     Other Topics Concern     Not on file     Social History Narrative     No narrative on file     No family history on file.    /72 (BP Location: Left arm, Patient Position: Chair, Cuff Size: Adult Regular)  Pulse 81  Temp 98.2  F (36.8  C) (Oral)  Resp 14  Ht 5' 8.7\" (174.5 cm)  Wt 159 lb 6.4 oz (72.3 kg)  SpO2 97%  BMI 23.75 kg/m2    Exam:  Constitutional: healthy, alert and no distress  Neck: Neck supple. No adenopathy. Thyroid symmetric, normal size,, Carotids without bruits.  Cardiovascular: negative, PMI normal. No lifts, heaves, or thrills. RRR. No murmurs, clicks gallops or rub  Respiratory: negative, Percussion normal. Good diaphragmatic excursion. Lungs clear  : Deferred  Musculoskeletal: flexion, extension, twisting, side-to-side elicit pain in low back.  ROM limited to ~ 20-30 degrees extension/flexion.  Neurologic: " Gait normal. Reflexes normal and symmetric. Sensation grossly WNL.  Psychiatric: mentation appears normal and affect normal/bright    Assessment/Plan:  1. Acute midline low back pain without sciatica    - PHYSICAL THERAPY REFERRAL  - ibuprofen (ADVIL/MOTRIN) 600 MG tablet; Take 1 tablet (600 mg) by mouth every 6 hours as needed for moderate pain  Dispense: 60 tablet; Refill: 1    Work comp paperwork filled out for patient.

## 2021-05-29 ENCOUNTER — OFFICE VISIT (OUTPATIENT)
Dept: FAMILY MEDICINE | Facility: CLINIC | Age: 31
End: 2021-05-29
Payer: COMMERCIAL

## 2021-05-29 VITALS
DIASTOLIC BLOOD PRESSURE: 68 MMHG | OXYGEN SATURATION: 98 % | HEART RATE: 59 BPM | SYSTOLIC BLOOD PRESSURE: 117 MMHG | BODY MASS INDEX: 23.55 KG/M2 | HEIGHT: 69 IN | WEIGHT: 159 LBS | RESPIRATION RATE: 14 BRPM | TEMPERATURE: 98.6 F

## 2021-05-29 DIAGNOSIS — R07.1 CHEST PAIN ON BREATHING: ICD-10-CM

## 2021-05-29 DIAGNOSIS — R07.81 PLEURITIC CHEST PAIN: Primary | ICD-10-CM

## 2021-05-29 DIAGNOSIS — Z71.6 ENCOUNTER FOR SMOKING CESSATION COUNSELING: ICD-10-CM

## 2021-05-29 PROCEDURE — 99203 OFFICE O/P NEW LOW 30 MIN: CPT

## 2021-05-29 PROCEDURE — 93000 ELECTROCARDIOGRAM COMPLETE: CPT

## 2021-05-29 RX ORDER — NICOTINE 21 MG/24HR
1 PATCH, TRANSDERMAL 24 HOURS TRANSDERMAL EVERY 24 HOURS
Qty: 30 PATCH | Refills: 0 | Status: SHIPPED | OUTPATIENT
Start: 2021-05-29 | End: 2021-06-28

## 2021-05-29 RX ORDER — EPINEPHRINE 0.3 MG/.3ML
0.3 INJECTION SUBCUTANEOUS
COMMUNITY
Start: 2020-03-19 | End: 2024-04-01

## 2021-05-29 RX ORDER — SERTRALINE HYDROCHLORIDE 25 MG/1
12.5 TABLET, FILM COATED ORAL DAILY
COMMUNITY
Start: 2021-04-03 | End: 2024-09-05

## 2021-05-29 ASSESSMENT — MIFFLIN-ST. JEOR: SCORE: 1676.6

## 2021-05-29 NOTE — PATIENT INSTRUCTIONS
Patient Education     Pleurisy    If you have pleurisy, the lining around your lungs is inflamed. This is most often due to a viral infection or pneumonia. It usually lasts for 10 to 14 days. It may cause sharp pain with breathing, coughing, sneezing, and movement. Antibiotics are usually not prescribed for this condition unless bacterial pneumonia is also present.  The following tips will help you care for your condition at home:    If symptoms are severe, rest at home for the first 2 to 3 days. When you resume activity, don't let yourself get too tired.    Don't smoke. Also stay away from secondhand smoke.    You may use over-the-counter medicines to control pain, unless another pain medicine was prescribed. (Note: If you have chronic liver or kidney disease or have ever had a stomach ulcer or gastrointestinal bleeding, talk with your healthcare provider before using these medicines. Also talk to your provider if you are taking medicine to prevent blood clots.) Aspirin should never be given to anyone younger than 18 years of age who is ill with a viral infection or fever. It may cause severe liver or brain damage.  Follow-up care  Follow up with your healthcare provider, or as advised.  When to seek medical advice  Call your healthcare provider right away if any of these occur:    Fever of 100.4 F (38 C) or higher, or as directed by your healthcare provider    Coughing up lots of colored sputum (mucus) or light, blood-tinged sputum    Redness, pain, or swelling of the leg  Call 911  Call 911 if any of these occur:    Increasing shortness of breath    Increasing chest pain, or pain that spreads to the neck, arm, or back    Coughing up blood  [employer] last reviewed this educational content on 6/1/2018 2000-2021 [employer]. All rights reserved. This information is not intended as a substitute for professional medical care. Always follow your healthcare professional's instructions.         Patient  "Education     Help for Smokers  What Are Your Reasons for Quitting?  Should I think about quitting smoking?  When it comes to lasting life change, readiness is everything. This may be the perfect time to quit using tobacco. If you have concerns about your health, this is your chance to reflect on your habits and make healthy changes.  Perhaps you have tried quitting in the past. It may help to think of quitting as a process that you take one day at a time. Every attempt brings you closer to success. And each time you try, you learn more about what works for you.  What are my reasons for quitting?  The first step in quitting is to decide why quitting is important to you. Here is one good reason:  \"Scientists have identified more than 7,000 chemicals and chemical compounds in tobacco smoke. At least 70 of them are known specifically to cause cancer.\"  --2014 Surgeon General Report  Consider health risks:    Smoking is the leading cause of heart disease, lung disease and stroke.    For the first time, women are as likely to die as men from many diseases caused by smoking.    Smoking can harm a person's health before birth and throughout his or her life.    Smoking causes cancer of the lungs, kidney, stomach, pancreas, cervix and bone marrow.    Smoking leads to impotence (loss of sexual function).    Smoking causes cataracts (clouding of the lens in the eye).    Smoking can cause you to lose teeth.    Smoking can lead to osteoporosis (brittle bone disease). This means a greater risk of broken bones. And if you smoke, your bones will heal more slowly.    Smoking leads to more infections. If you are healing from surgery, your incision has a greater chance of getting infected, which delays healing.    Smoking leads to more trips to the hospital--and longer stays.    Smokers are 30 to 40 percent more likely to develop type 2 diabetes than non-smokers.    About 3 out of 4 teen smokers become adult smokers, even if they plan " "to quit in a few years.    Secondhand smoke exposure is now known to cause strokes in nonsmokers.    More than 33,000 nonsmokers die every year in the [place] from coronary artery disease caused by exposure to secondhand smoke.  Consider personal reasons:    Smoking costs too much money.    My clothes stink.    I miss out on activities with my family because I am away smoking.    My breathing test showed I have the beginnings of emphysema.    A loved one  of cancer.    Smoking seems to control my life.  What other reasons do you have for quitting?  __________________________________________  __________________________________________  Does the way I talk to myself affect my ability to quit smoking?  If you tell yourself you can't quit, it will be very hard to quit. If you tell yourself you are a non-smoker, you will live up to that name.  Quitting happens one day at a time. When you feel a strong urge to smoke, think about your larger goal: to have a free and healthy life.  If I change my behaviors, am I more likely to succeed?  To go to the Super Bowl, a football team must have an action plan. Team members must prepare for the hernandez to win. They have to have more than one play. And if they don't make it to the big game, they don't give up--they simply make a new plan for next time.  Like getting to the Super Bowl, quitting smoking calls for more than just willpower. Nicotine is a powerful, addictive drug. Withdrawal symptoms are much like those from cocaine or heroin. To fight them, you need many \"plays\" throughout the day.  Most people do not succeed the first time they try to quit smoking. It may take several tries, and you may need several action plans.  Start your day with a plan. Change how and where you do things. If you always smoke in a certain chair in the living room, don't go there. If you always have a cigarette with a drink, avoid drinking for a while. Other steps you might take:    Instead " "of smoking in the morning, brush your teeth when you first get up, then eat a healthy breakfast.    Instead of smoking in the car, stash some low-fat treats in the glove box. Or buy special music for the ride.    Instead of smoking after meals, clear the table and go for a walk.    Instead of smoking when you're under stress, do some deep-breathing exercises.  When you wake up the next day, start your action plan again. See yourself as a winner. Being free of nicotine puts you in control of your body and health. Don't take your eyes off that goal, even when the going gets rough.  What can I do about nicotine withdrawal?  Nicotine addiction is not about willpower or strength of character. It's about brain chemistry. Nicotine provides a \"kick,\" causing the brain to release chemicals into the body. These chemicals give you a feeling of pleasure. Depending on the dose, they can also make you feel calm. People smoke to keep high levels of these chemicals in the body. The pleasure they feel makes them want to keep smoking.  In the first 48 hours after quitting, nicotine withdrawal can be intense. You may feel irritable and have strong cigarette cravings. You might have a hard time going to sleep or concentrating while awake. You may want to eat more than usual.  The good news is, these symptoms peak within a few days. They should subside within a few weeks.  Do smoking aids help?  Yes. Using a smoking aid--such as a nicotine patch or a medicine like Chantix--can increase your chances for success. Ask your doctor which aid is best for you:    Nicotine patches    Nicotine gum or lozenges    Nicotine nasal spray or a nicotine inhaler (a plastic filter that you puff almost like a cigarette), prescribed by a doctor    Varenicline (Chantix) or bupropion (Zyban, Wellbutrin), prescribed by a doctor  Nicotine replacement helps with the physical addiction. If you combine this with a quit-smoking program, you can double your chances " for success.  What is my action plan?    Before you quit, make two lists: reasons you want to quit and reasons you don't. When your first list is twice as long as the second, you will be more likely to succeed.    Pick a quit date. Perhaps it was the day you came to the hospital or clinic.    Tell friends and family.    Stock up on carrots, sugar-free mints, cinnamon sticks and other items to keep your mouth busy.    Keep your hands busy with drawing, knitting, playing an instrument or other hobbies.    Find support. Will you use nicotine replacement? Attend a class? Join [employer]? Talk with friends who have quit?    Get rid of all cigarettes, lighters, ashtrays and other smoking items.    Keep active. Exercise will release pleasure chemicals in your brain, just like smoking did. Try walking, biking, gardening and other activities.    Drink lots of water.    Reduce or avoid alcohol.    Breathe deeply. When you smoked, you breathed the smoke deep into your lungs. Now picture your lungs filling with fresh, clean air.    Reward yourself with the money you saved by quitting. Go to a movie, take a vacation, buy a car.  Today, there are more former smokers than current smokers. Each year, over half of all daily smokers try to quit.  How quickly will my health improve?  Your body has an amazing way of healing itself over time, if you let it. Here are just a few of the long-term rewards of quitting. But don't forget--all these benefits will end if you light another cigarette.  The moment you stop smoking  The air around you is no longer harmful to children or other adults.  After 20 minutes  Your blood pressure and heart rate drop to normal.  Your hands and feet warm to a normal temperature.  After 8 hours  The carbon monoxide level in your blood drops to normal.  The oxygen level in your blood rises to normal.  After 24 hours  Your chance of heart attack is lower.  After 48 hours  Your sense of smell and taste  improve.  After 2 weeks to 3 months  Your blood flow improves.  It's easier for you to walk.  Your lungs work up to 30 percent better than before.  You catch colds and the flu less often.  After 1 to 9 months  You have less coughing and shortness of breath.  You have fewer sinus problems.  You have more energy.  You feel better about yourself because you've done what you set out to do.  After 1 year  You cut your risk of heart disease in half.  You lower your risk of cancer and lung disease.  You have fewer sick days and health problems.  You reduce the cost of your auto, home and life insurance.  After 10 years  Normal cells replace your pre-cancer cells.  You greatly lower your risk of mouth, larynx, lung and bladder cancer.  Your risk of artery disease is the same as if you had never smoked.  You--and anyone who was exposed to your second-hand smoke--will have a longer life expectancy.  For more information  [employer] (7-628-310-PLAN), the [place] Tobacco Quit Line, provides free professional counseling over the phone.  www.[employer].com  For informational purposes only. Not to replace the advice of your health care provider.  Copyright   2004 [facility]. All rights reserved. Clinically reviewed by Cheyanne Arevalo. mTraks [id number] - REV 08/18.         Stop smoking.  Take tylenol or motrin as needed for pain.

## 2021-05-29 NOTE — PROGRESS NOTES
Assessment & Plan     Chest pain  **  - EKG 12-lead complete w/read - Clinics    Pleuritic chest pain  **  See patient instructions  Wells score 0    Encounter for smoking cessation counseling  **  - nicotine (NICODERM CQ) 21 MG/24HR 24 hr patch  Dispense: 30 patch; Refill: 0      Diagnosis and treatment plan were discussed with patient and/or parent. If symptoms worsen or do not improve in the next few days, follow-up with your primary care provider or visit an [facility] urgent care clinic location.  Patient verbalizes understanding of all things discussed. All questions were addressed and answered.       Return in about 2 weeks (around 6/12/2021) for If not better, sooner if worsening.    Radha Kapoor PA-C    [facility]    Gurmeet Gould is a 31 year old male who presents to clinic today for the following health issues:  Chief Complaint   Patient presents with     Chest Pain     Since this morning no arm pains.      HPI    Chest pain started this morning upon waking up this morning. Patient states he had a cigarette at about 8:30am, went back to sleep and then woke up at 9:30am with stabbing chest pain. Normal smokes RainKing menthols. Stopped those and then started smoking the lights for a week. Then had one of the [employer] menthols this morning.  His pain worse with deep inspiration and expiration and the pain is on the right side of his chest. Has felt slightly short of breath today as well.   Took some ibuprofen earlier but it didn't help.   Pain is not radiating.   Feels slightly fatigued. No nausea. No light headedness or loss of consciousness. Feels foggy. Denies cough or fever.    Smoker since 19 years old.     Weed recently- two days ago.     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   Drinks alcohol socially.    Works at [employer] and the [employer] upstairs at the [employer].     Has had hx asthma since he was a child.  "    No family history of early cardiac events.     Review of Systems  Constitutional, HEENT, cardiovascular, pulmonary, gi and gu systems are negative, except as otherwise noted.      Objective    /68   Pulse 59   Temp 98.6  F (37  C) (Oral)   Resp 14   Ht 1.753 m (5' 9\")   Wt 72.1 kg (159 lb)   SpO2 98%   BMI 23.48 kg/m    Physical Exam   GENERAL: healthy, alert and no distress  EYES: Eyes grossly normal to inspection, PERRL and conjunctivae and sclerae normal  HENT: ear canals and TM's normal, nose and mouth without ulcers or lesions  NECK: no adenopathy, no asymmetry, masses, or scars and thyroid normal to palpation  RESP: lungs clear to auscultation - no rales, rhonchi. Faint wheeze posterior RUL. Pain reproducible anterior mid chest.  CV: regular rate and rhythm, normal S1 S2, no S3 or S4, no murmur, click or rub, no peripheral edema and peripheral pulses strong  ABDOMEN: soft, nontender, no hepatosplenomegaly, no masses and bowel sounds normal  MS: no gross musculoskeletal defects noted, no edema  SKIN: no suspicious lesions or rashes  NEURO: Normal strength and tone, mentation intact and speech normal  PSYCH: mentation appears normal, affect normal/bright    EKG: sinus nova, within normal limits         "

## 2021-05-29 NOTE — LETTER
May 29, 2021      Luis Fernando VERMA Juan  [address] [place] [id number]        To Whom It May Concern:    Luis Fernando BAYRON Martinez  was seen on 5/29/2021.  Please excuse him  until 5/31/2021 due to illness.        Sincerely,        [facility]

## 2024-04-01 ENCOUNTER — OFFICE VISIT (OUTPATIENT)
Dept: FAMILY MEDICINE | Facility: CLINIC | Age: 34
End: 2024-04-01
Payer: COMMERCIAL

## 2024-04-01 VITALS
OXYGEN SATURATION: 96 % | WEIGHT: 148 LBS | HEART RATE: 103 BPM | DIASTOLIC BLOOD PRESSURE: 80 MMHG | SYSTOLIC BLOOD PRESSURE: 136 MMHG | TEMPERATURE: 98 F | RESPIRATION RATE: 18 BRPM | HEIGHT: 70 IN | BODY MASS INDEX: 21.19 KG/M2

## 2024-04-01 DIAGNOSIS — F41.1 GAD (GENERALIZED ANXIETY DISORDER): ICD-10-CM

## 2024-04-01 DIAGNOSIS — J45.990 EXERCISE-INDUCED ASTHMA: ICD-10-CM

## 2024-04-01 DIAGNOSIS — F17.200 TOBACCO USE DISORDER: ICD-10-CM

## 2024-04-01 DIAGNOSIS — F32.1 CURRENT MODERATE EPISODE OF MAJOR DEPRESSIVE DISORDER WITHOUT PRIOR EPISODE (H): Primary | ICD-10-CM

## 2024-04-01 DIAGNOSIS — F14.10 COCAINE USE DISORDER (H): ICD-10-CM

## 2024-04-01 DIAGNOSIS — Z91.010 PEANUT ALLERGY: ICD-10-CM

## 2024-04-01 DIAGNOSIS — J30.1 SEASONAL ALLERGIC RHINITIS DUE TO POLLEN: ICD-10-CM

## 2024-04-01 PROCEDURE — 90677 PCV20 VACCINE IM: CPT

## 2024-04-01 PROCEDURE — 90715 TDAP VACCINE 7 YRS/> IM: CPT

## 2024-04-01 PROCEDURE — 90472 IMMUNIZATION ADMIN EACH ADD: CPT

## 2024-04-01 PROCEDURE — 90471 IMMUNIZATION ADMIN: CPT

## 2024-04-01 PROCEDURE — 99214 OFFICE O/P EST MOD 30 MIN: CPT | Mod: 25

## 2024-04-01 RX ORDER — SERTRALINE HYDROCHLORIDE 100 MG/1
100 TABLET, FILM COATED ORAL DAILY
Qty: 30 TABLET | Refills: 0 | Status: SHIPPED | OUTPATIENT
Start: 2024-04-01 | End: 2024-05-22

## 2024-04-01 RX ORDER — ALBUTEROL SULFATE 90 UG/1
2 AEROSOL, METERED RESPIRATORY (INHALATION) EVERY 4 HOURS PRN
Qty: 18 G | Refills: 3 | Status: SHIPPED | OUTPATIENT
Start: 2024-04-01

## 2024-04-01 RX ORDER — EPINEPHRINE 0.3 MG/.3ML
0.3 INJECTION SUBCUTANEOUS PRN
Qty: 2 EACH | Refills: 1 | Status: SHIPPED | OUTPATIENT
Start: 2024-04-01

## 2024-04-01 RX ORDER — LORATADINE 10 MG/1
10 TABLET ORAL DAILY
Qty: 90 TABLET | Refills: 3 | Status: SHIPPED | OUTPATIENT
Start: 2024-04-01 | End: 2024-09-04

## 2024-04-01 ASSESSMENT — ASTHMA QUESTIONNAIRES
QUESTION_2 LAST FOUR WEEKS HOW OFTEN HAVE YOU HAD SHORTNESS OF BREATH: ONCE OR TWICE A WEEK
QUESTION_3 LAST FOUR WEEKS HOW OFTEN DID YOUR ASTHMA SYMPTOMS (WHEEZING, COUGHING, SHORTNESS OF BREATH, CHEST TIGHTNESS OR PAIN) WAKE YOU UP AT NIGHT OR EARLIER THAN USUAL IN THE MORNING: ONCE A WEEK
ACT_TOTALSCORE: 20
QUESTION_5 LAST FOUR WEEKS HOW WOULD YOU RATE YOUR ASTHMA CONTROL: SOMEWHAT CONTROLLED
ACT_TOTALSCORE: 20
QUESTION_1 LAST FOUR WEEKS HOW MUCH OF THE TIME DID YOUR ASTHMA KEEP YOU FROM GETTING AS MUCH DONE AT WORK, SCHOOL OR AT HOME: NONE OF THE TIME
QUESTION_4 LAST FOUR WEEKS HOW OFTEN HAVE YOU USED YOUR RESCUE INHALER OR NEBULIZER MEDICATION (SUCH AS ALBUTEROL): NOT AT ALL

## 2024-04-01 ASSESSMENT — PATIENT HEALTH QUESTIONNAIRE - PHQ9
5. POOR APPETITE OR OVEREATING: NOT AT ALL
SUM OF ALL RESPONSES TO PHQ QUESTIONS 1-9: 5
10. IF YOU CHECKED OFF ANY PROBLEMS, HOW DIFFICULT HAVE THESE PROBLEMS MADE IT FOR YOU TO DO YOUR WORK, TAKE CARE OF THINGS AT HOME, OR GET ALONG WITH OTHER PEOPLE: SOMEWHAT DIFFICULT
SUM OF ALL RESPONSES TO PHQ QUESTIONS 1-9: 5

## 2024-04-01 ASSESSMENT — ANXIETY QUESTIONNAIRES
2. NOT BEING ABLE TO STOP OR CONTROL WORRYING: NEARLY EVERY DAY
5. BEING SO RESTLESS THAT IT IS HARD TO SIT STILL: NOT AT ALL
6. BECOMING EASILY ANNOYED OR IRRITABLE: NEARLY EVERY DAY
GAD7 TOTAL SCORE: 12
1. FEELING NERVOUS, ANXIOUS, OR ON EDGE: SEVERAL DAYS
GAD7 TOTAL SCORE: 12
3. WORRYING TOO MUCH ABOUT DIFFERENT THINGS: NEARLY EVERY DAY
7. FEELING AFRAID AS IF SOMETHING AWFUL MIGHT HAPPEN: MORE THAN HALF THE DAYS

## 2024-04-01 NOTE — PROGRESS NOTES
Preceptor Attestation:  Patient's case reviewed and discussed with the resident, Tremayne Ramirez MD, and I personally evaluated the patient. I agree with written assessment and plan of care.    Supervising Physician:  Lilliam Kohli MD   [facility]

## 2024-04-01 NOTE — PROGRESS NOTES
"  Assessment & Plan     Current moderate episode of major depressive disorder without prior episode (H)  STACIE (generalized anxiety disorder)  Mental health symptoms uncontrolled. Triggers include mom dying of overdose in 2017 and high-stress job. PHQ 5 and STACIE 12 today. Unfortunately has been on suboptimal dose of sertraline for years, will uptitrate on accelerated schedule over next several weeks and follow up in one month. Has never been in therapy but very interested, referrals placed today. Would benefit from grief support group. Denies SI today, endorses having support system.   - sertraline (ZOLOFT) 50 MG tablet; Take 1 tablet (50 mg) by mouth daily for 14 days, THEN 2 tablets (100 mg) daily for 14 days.  - Adult Mental Health  Referral; Future  - sertraline (ZOLOFT) 100 MG tablet; Take 1 tablet (100 mg) by mouth daily    Cocaine use disorder (H)  Tobacco use disorder  History of meth use, 7 years in remission. 6 month history of cocaine use, has access from co-workers, using several times daily. Has been using to cope with above mental health symptoms, wanting to quit. Uses marijuana to help reduce the amount of cocaine he would otherwise be using. Would benefit from substance use treatment, especially given expectation that mental health symptoms would worsen with cessation. Mental health and social work referrals placed. Also currently smoking 1-1.5 ppd, will further discuss treatment options after addressing above issues.   - Adult Mental Health  Referral; Future  - Primary Care - Care Coordination Referral; Future      Refills requested:    Exercise-induced asthma  ACT score 20 today. Uses albuterol irregularly, \"maybe once a week.\" Lungs clear today. Suspect tobacco cessation would greatly reduce symptoms.   - albuterol (PROAIR HFA/PROVENTIL HFA/VENTOLIN HFA) 108 (90 Base) MCG/ACT inhaler; Inhale 2 puffs into the lungs every 4 hours as needed for shortness of breath or " "wheezing    Seasonal allergic rhinitis due to pollen  - loratadine (CLARITIN) 10 MG tablet; Take 1 tablet (10 mg) by mouth daily    Peanut allergy  Last reaction about 7 months ago when tried dish with peanuts in it at work. Symptoms alleviated with benadryl.   - EPINEPHrine (ANY BX GENERIC EQUIV) 0.3 MG/0.3ML injection 2-pack; Inject 0.3 mLs (0.3 mg) into the muscle as needed for anaphylaxis      Nicotine/Tobacco Cessation  He reports that he has been smoking cigarettes. He has been smoking an average of 1 pack per day. He does not have any smokeless tobacco history on file.  Nicotine/Tobacco Cessation Plan  Will further discuss treatment options at follow up visits.      Return in about 4 weeks (around 2024) for Follow up.    Gurmeet Gould is a 34 year old, presenting for the following health issues:  Establish Care and Recheck Medication (Would like to go over sertraline dose, possibly increase)        2024     2:46 PM   Additional Questions   Roomed by Emily   Accompanied by self         2024    Information    services provided? No     HPI   Establishing care.   Needing sertraline and loratidine. Has been taking sertraline since mom .   Only child, lost mom in 2017. Worry didn't go away. High stress work environment. \"People getting hurt, people getting drunk.\" Hopeless and alone.   Addiction- coke and marijuana. 6 months use. 3-4x daily \"to fill void.\" Uses marijuana to help reduce amount of cocaine he would otherwise use.   Support system - cousin, best friend Emanuel. Has boyfriend of 5 years. Dad.   People at work are his access point.   Recovering meth of 7 years - hasn't used any since.   No previous therapy, wants to start.   Will think about death, but not wanting to do it.   Smoking 1-1.5 ppd. EtOH 1-3 drinks periodically, depending how stressed.   Asthma \"could be better.\" Uses albuterol as needed only, \"maybe once a week.\" Exercise induced. Denies ever " "being on Advair. \"Had a scare\" one year ago and couldn't breathe. Got better with neb.   Uses flonase occasionally.   7 months had a reaction to dish with peanut butter in it.     Answers submitted by the patient for this visit:  Patient Health Questionnaire (Submitted on 4/1/2024)  If you checked off any problems, how difficult have these problems made it for you to do your work, take care of things at home, or get along with other people?: Somewhat difficult  PHQ9 TOTAL SCORE: 5      Review of Systems  Constitutional, HEENT, cardiovascular, pulmonary, gi and gu systems are negative, except as otherwise noted.      Objective    /80   Pulse 103   Temp 98  F (36.7  C) (Oral)   Resp 18   Ht 1.778 m (5' 10\")   Wt 67.1 kg (148 lb)   SpO2 96%   BMI 21.24 kg/m    Body mass index is 21.24 kg/m .  Physical Exam   GENERAL: alert and no distress  EYES: Eyes grossly normal to inspection  RESP: lungs clear to auscultation - no rales, rhonchi or wheezes  CV: mild tachycardia, regular rhythm, normal S1 S2, no murmurs  SKIN: no suspicious lesions or rashes  PSYCH: mentation appears normal, mildly anxious         Signed Electronically by: Tremayne Ramirez MD    "

## 2024-04-01 NOTE — PATIENT INSTRUCTIONS
INCREASE zoloft to 50 mg for 2 weeks, and then 100 mg daily after that.   Someone will call to schedule therapy and to discuss resources for addiction and grief support.   I have refilled your other meds.   See you in one month, or sooner if you have other concerns.

## 2024-04-11 ENCOUNTER — MYC MEDICAL ADVICE (OUTPATIENT)
Dept: CARE COORDINATION | Facility: CLINIC | Age: 34
End: 2024-04-11

## 2024-04-19 ENCOUNTER — MYC REFILL (OUTPATIENT)
Dept: FAMILY MEDICINE | Facility: CLINIC | Age: 34
End: 2024-04-19

## 2024-04-25 NOTE — TELEPHONE ENCOUNTER
Addressed by  NP clinic( [facility]). Patient's PCP listed as :  Team Member Role and Specialty Contact Info Address Start End Comments   PCPs         Tremayne Ramirez MD General  Phone: [phone]  Fax: [phone]  Email: [email or web address] [address] [place] [address] 4/1/2024 - -

## 2024-04-30 ENCOUNTER — MYC MEDICAL ADVICE (OUTPATIENT)
Dept: CARE COORDINATION | Facility: CLINIC | Age: 34
End: 2024-04-30

## 2024-05-04 ENCOUNTER — HEALTH MAINTENANCE LETTER (OUTPATIENT)
Age: 34
End: 2024-05-04

## 2024-05-20 ENCOUNTER — MYC REFILL (OUTPATIENT)
Dept: FAMILY MEDICINE | Facility: CLINIC | Age: 34
End: 2024-05-20

## 2024-05-20 DIAGNOSIS — F41.1 GAD (GENERALIZED ANXIETY DISORDER): ICD-10-CM

## 2024-05-20 DIAGNOSIS — F32.1 CURRENT MODERATE EPISODE OF MAJOR DEPRESSIVE DISORDER WITHOUT PRIOR EPISODE (H): ICD-10-CM

## 2024-05-20 DIAGNOSIS — J30.1 SEASONAL ALLERGIC RHINITIS DUE TO POLLEN: ICD-10-CM

## 2024-05-20 RX ORDER — SERTRALINE HYDROCHLORIDE 100 MG/1
100 TABLET, FILM COATED ORAL DAILY
Qty: 30 TABLET | Refills: 0 | OUTPATIENT
Start: 2024-05-20

## 2024-05-20 RX ORDER — LORATADINE 10 MG/1
10 TABLET ORAL DAILY
Qty: 90 TABLET | Refills: 3 | OUTPATIENT
Start: 2024-05-20

## 2024-05-20 RX ORDER — SERTRALINE HYDROCHLORIDE 100 MG/1
100 TABLET, FILM COATED ORAL DAILY
Qty: 90 TABLET | Refills: 0 | OUTPATIENT
Start: 2024-05-20

## 2024-05-21 ENCOUNTER — HOSPITAL ENCOUNTER (EMERGENCY)
Facility: HOSPITAL | Age: 34
Discharge: HOME | End: 2024-05-22
Attending: EMERGENCY MEDICINE | Admitting: EMERGENCY MEDICINE
Payer: COMMERCIAL

## 2024-05-21 DIAGNOSIS — F14.90 COCAINE USE: ICD-10-CM

## 2024-05-21 DIAGNOSIS — J34.0: ICD-10-CM

## 2024-05-21 PROCEDURE — 99284 EMERGENCY DEPT VISIT MOD MDM: CPT

## 2024-05-21 ASSESSMENT — COLUMBIA-SUICIDE SEVERITY RATING SCALE - C-SSRS
2. HAVE YOU ACTUALLY HAD ANY THOUGHTS OF KILLING YOURSELF IN THE PAST MONTH?: NO
1. IN THE PAST MONTH, HAVE YOU WISHED YOU WERE DEAD OR WISHED YOU COULD GO TO SLEEP AND NOT WAKE UP?: NO
6. HAVE YOU EVER DONE ANYTHING, STARTED TO DO ANYTHING, OR PREPARED TO DO ANYTHING TO END YOUR LIFE?: NO

## 2024-05-22 ENCOUNTER — PATIENT OUTREACH (OUTPATIENT)
Dept: CARE COORDINATION | Facility: CLINIC | Age: 34
End: 2024-05-22

## 2024-05-22 VITALS
RESPIRATION RATE: 16 BRPM | OXYGEN SATURATION: 97 % | HEART RATE: 62 BPM | SYSTOLIC BLOOD PRESSURE: 143 MMHG | TEMPERATURE: 98.2 F | DIASTOLIC BLOOD PRESSURE: 82 MMHG

## 2024-05-22 PROCEDURE — 250N000009 HC RX 250: Performed by: EMERGENCY MEDICINE

## 2024-05-22 RX ORDER — LIDOCAINE HYDROCHLORIDE 20 MG/ML
SOLUTION OROPHARYNGEAL
Qty: 20 ML | Refills: 0 | Status: SHIPPED | OUTPATIENT
Start: 2024-05-22

## 2024-05-22 RX ORDER — LIDOCAINE HYDROCHLORIDE 20 MG/ML
JELLY TOPICAL ONCE
Status: COMPLETED | OUTPATIENT
Start: 2024-05-22 | End: 2024-05-22

## 2024-05-22 RX ORDER — CIPROFLOXACIN 500 MG/1
500 TABLET, FILM COATED ORAL 2 TIMES DAILY
Qty: 10 TABLET | Refills: 0 | Status: SHIPPED | OUTPATIENT
Start: 2024-05-22 | End: 2024-05-27

## 2024-05-22 RX ADMIN — LIDOCAINE HYDROCHLORIDE: 20 JELLY TOPICAL at 00:51

## 2024-05-22 ASSESSMENT — ACTIVITIES OF DAILY LIVING (ADL): ADLS_ACUITY_SCORE: 35

## 2024-05-22 NOTE — PROGRESS NOTES
Clinic Care Coordination Contact  Follow Up Progress Note      Assessment:  The pt was recently in the ED, I called to check up on the pt and help the pt setup a ED follow up. The pt was at [facility] for nose problem. I called the pt,but got his vm, so I left a vm for the pt to give me a call back. Pt has a follow up on 05/29/2024 at 2:00pm with .    Care Gaps:    Health Maintenance Due   Topic Date Due    ASTHMA ACTION PLAN  Never done    ADVANCE CARE PLANNING  Never done    DEPRESSION ACTION PLAN  Never done    HIV SCREENING  Never done    HEPATITIS C SCREENING  Never done    YEARLY PREVENTIVE VISIT  07/30/2011    COVID-19 Vaccine (2 - 2023-24 season) 09/01/2023           Care Plans      Intervention/Education provided during outreach:               Plan:     Care Coordinator will follow up in

## 2024-05-22 NOTE — DISCHARGE INSTRUCTIONS
Referral Options:     [facility] (Specializes in stimulant use disorders/addictions)  [address] [place] [address] [phone] or email [email or web address]     [facility]  [address] [place] [id number]  Phone: [phone]     [facility]  [address]  [place] [address]  Phone: [phone]

## 2024-05-22 NOTE — ED TRIAGE NOTES
C/o right sided nose pain. Pt chronic user of cocaine, snorts. Per pt painful if not using. Used just PTA to help pain. No bleeding at time of triage however states some slight bleeding off and on     Triage Assessment (Adult)       Row Name 05/21/24 9024          Triage Assessment    Airway WDL WDL        Respiratory WDL    Respiratory WDL WDL        Skin Circulation/Temperature WDL    Skin Circulation/Temperature WDL WDL        Cardiac WDL    Cardiac WDL WDL        Peripheral/Neurovascular WDL    Peripheral Neurovascular WDL WDL        Cognitive/Neuro/Behavioral WDL    Cognitive/Neuro/Behavioral WDL WDL

## 2024-05-22 NOTE — ED PROVIDER NOTES
Emergency Department Encounter      NAME: Luis Fernando Martinez  AGE: 34 year old male  YOB: 1990  MRN: [id number]  EVALUATION DATE & TIME: No admission date for patient encounter.    PCP: Tremayne Ramirez    ED PROVIDER: Dimitrios Leslie M.D.      Chief Complaint   Patient presents with    Nose Problem         FINAL IMPRESSION:  1. Nose septum ulceration    2. Cocaine use        MEDICAL DECISION MAKIN:28 AM I met with the patient, obtained history, performed an initial exam, and discussed options and plan for diagnostics and treatment here in the ED.   12:47 AM We discussed the plan for discharge and the patient is agreeable. Reviewed supportive cares, symptomatic treatment, outpatient follow up, and reasons to return to the Emergency Department. Patient to be discharged by ED RN.     This patient is a 34-year-old male with a history of anxiety, seasonal allergies and daily cocaine use who presents with nasal pain.  He says that he has been using around half a gram of cocaine daily for the past year.  He has been switching nostrils but he has developed increasing pain in both nostrils over the past 2 weeks.  He has been trying to relieve the pain using tea, saline, steam, Aquaphor and also more cocaine.  He did contact his doctor earlier today.  He has been prescribed Flonase which he is using but has not noticed any change.  On my exam the patient looked uncomfortable.  He had 2 open areas on his nasal septum which were the source of his pain.  Further in the nares there was some congestion seen with some thicker blood streaked yellowish mucus.  I discussed treatment of this as he was very insistent on getting chest pain related.  I used 2% viscous lidocaine and applied it inside both nares while the patient was laying on his back.  He was given an initial dose and a prescription for viscous lidocaine upon discharge.  I also gave him a antibiotic prescription for Cipro to hopefully prevent  infection of the cartilage.  I discussed with him the future course of the day he continues to use cocaine daily.  He does have resources given to him recently that he can call to help him with his cocaine addiction.    Pertinent Labs & Imaging studies reviewed. (See chart for details)    The importance of close follow up was discussed. We reviewed warning signs and symptoms, and I instructed Mr. Martinez to return to the emergency department immediately if he develops any new or worsening symptoms. I provided additional verbal discharge instructions. Mr. Martinez expressed understanding and agreement with this plan of care, his questions were answered, and he was discharged in stable condition.     Medical Decision Making     History:  Supplemental history from: Documented in chart, if applicable  External Record(s) reviewed: Documented in chart, if applicable.     Work Up:  Chart documentation includes differential considered and any EKGs or imaging independently interpreted by provider, where specified.  In additional to work up documented, I considered the following work up: Documented in chart, if applicable.     External consultation:  Discussion of management with another provider: Documented in chart, if applicable     Complicating factors:  Care impacted by chronic illness: STACIE, Smoker  Care affected by social determinants of health: Access to Medical Care, Substance abuse     Disposition considerations: Discharged with prescriptions for viscous lidocaine and ciprofloxacin      MEDICATIONS GIVEN IN THE EMERGENCY:  Medications   lidocaine (XYLOCAINE) 2 % external gel ( Topical $Given 5/22/24 0051)       NEW PRESCRIPTIONS STARTED AT TODAY'S ER VISIT:  New Prescriptions    CIPROFLOXACIN (CIPRO) 500 MG TABLET    Take 1 tablet (500 mg) by mouth 2 times daily for 5 days    LIDOCAINE, VISCOUS, (XYLOCAINE) 2 % SOLUTION    Apply to painful areas inside nose with q tip every hour as needed for pain; Max 8 doses/24  hour period.          =================================================================    HPI    Patient information was obtained from: patient    Use of : N/A         Luis Fernando Martinez is a 34 year old male with a past medical history of STACIE, tobacco use disorder, cocaine use disorder, who presents nose problem.    Patient reports he is a chronic cocaine user (uses daily, snorts about 0.5 g daily, usually alternates nostrils). Patient states currently, he has 10/10 severe pain in both of his nares. He's had the pain before but it was never as severe as currently. He's tried Flonase without any relief. He currently denies associating fever or sinus pain. He doesn't have a PCP. He is open to seeking treatment for his cocaine addiction. There were no other concerns/complaints at this time.      REVIEW OF SYSTEMS   Review of Systems     PAST MEDICAL HISTORY:  History reviewed. No pertinent past medical history.    PAST SURGICAL HISTORY:  History reviewed. No pertinent surgical history.    CURRENT MEDICATIONS:    No current facility-administered medications for this encounter.    Current Outpatient Medications:     ciprofloxacin (CIPRO) 500 MG tablet, Take 1 tablet (500 mg) by mouth 2 times daily for 5 days, Disp: 10 tablet, Rfl: 0    lidocaine, viscous, (XYLOCAINE) 2 % solution, Apply to painful areas inside nose with q tip every hour as needed for pain; Max 8 doses/24 hour period., Disp: 20 mL, Rfl: 0    albuterol (PROAIR HFA/PROVENTIL HFA/VENTOLIN HFA) 108 (90 Base) MCG/ACT inhaler, Inhale 2 puffs into the lungs every 4 hours as needed for shortness of breath or wheezing, Disp: 18 g, Rfl: 3    EPINEPHrine (ANY BX GENERIC EQUIV) 0.3 MG/0.3ML injection 2-pack, Inject 0.3 mLs (0.3 mg) into the muscle as needed for anaphylaxis, Disp: 2 each, Rfl: 1    fluticasone (FLONASE) 50 MCG/ACT spray, Spray 2 sprays into both nostrils daily, Disp: 16 g, Rfl: 1    loratadine (CLARITIN) 10 MG tablet, Take 1 tablet (10  mg) by mouth daily, Disp: 90 tablet, Rfl: 3    sertraline (ZOLOFT) 100 MG tablet, Take 1 tablet (100 mg) by mouth daily, Disp: 90 tablet, Rfl: 0    sertraline (ZOLOFT) 100 MG tablet, Take 1 tablet (100 mg) by mouth daily, Disp: 30 tablet, Rfl: 0    sertraline (ZOLOFT) 25 MG tablet, Take 12.5 mg by mouth daily, Disp: , Rfl:     ALLERGIES:  Allergies   Allergen Reactions    Peanut-Containing Drug Products Anaphylaxis       FAMILY HISTORY:  History reviewed. No pertinent family history.    SOCIAL HISTORY:   Social History     Socioeconomic History    Marital status: Single   Tobacco Use    Smoking status: Every Day     Current packs/day: 1.00     Types: Cigarettes   Vaping Use    Vaping status: Every Day     Social Determinants of Health     Financial Resource Strain: Low Risk  (4/1/2024)    Financial Resource Strain     Within the past 12 months, have you or your family members you live with been unable to get utilities (heat, electricity) when it was really needed?: No   Food Insecurity: Low Risk  (4/1/2024)    Food Insecurity     Within the past 12 months, did you worry that your food would run out before you got money to buy more?: No     Within the past 12 months, did the food you bought just not last and you didn t have money to get more?: No   Transportation Needs: Low Risk  (4/1/2024)    Transportation Needs     Within the past 12 months, has lack of transportation kept you from medical appointments, getting your medicines, non-medical meetings or appointments, work, or from getting things that you need?: No   Interpersonal Safety: Low Risk  (4/1/2024)    Interpersonal Safety     Do you feel physically and emotionally safe where you currently live?: Yes     Within the past 12 months, have you been hit, slapped, kicked or otherwise physically hurt by someone?: No     Within the past 12 months, have you been humiliated or emotionally abused in other ways by your partner or ex-partner?: No   Housing Stability: Low  Risk  (4/1/2024)    Housing Stability     Do you have housing? : Yes     Are you worried about losing your housing?: No       PHYSICAL EXAM:    Vitals: BP (!) 153/89   Pulse 93   Temp 98.2  F (36.8  C)   Resp 16   SpO2 99%    Constitutional: Anxious animated white  male who is pasting in his exam room holding his nose.  HEAD:Normocephalic, atraumatic,   Eyes: PERRLA,  conjunctiva clear, no discharge  ENT: mucous membranes moist, both sides of nasal septum there are 2 cm areas that appear slightly erythematous. Scant amount of blood and mucus further in left nostril. No deformity was seen. No sinus tenderness to percussion. Posterior oropharynx normal.  Neck- Supple, gross ROM intact.  No JVD.  No palpable nodes.  Pulmonary: Clear to auscultation bilaterally, no respiratory distress, no wheezing, speaks full sentences easily.  Cardiovascular: Normal heart rate, regular rhythm, no murmurs. No lower extremity edema, 2+ DP pulses.   Skin: Warm, dry, no rash.  Neurologic: Alert & oriented x 3, speech clear, moving all extremities spontaneously   Psychiatric: Anxious, cooperative.     PROCEDURES:   Procedures     PROCEDURE: Application of local anesthetic   INDICATIONS: Bilateral nares pain   PROCEDURE PROVIDER: Dr Dimitrios Leslie   CONSENT: Risks, benefits and alternatives were discussed with and Verbal consent was obtained from Patient.   MEDICATIONS: 1/2 ml of 2% Lidocaine gel     DETAILS: Inserted 1/2 ml of 2% Lidocaine gel into each nostril   COMPLICATIONS: Patient tolerated procedure well, without complication         ILexy, am serving as a scribe to document services personally performed by Dr. Dimitrios Leslie based on my observation and the provider's statements to me. IDimitrios M.D. attest that Lexy Mcwilliams is acting in a scribe capacity, has observed my performance of the services and has documented them in accordance with my direction.      Dimitrios Leslie M.D.  Emergency Medicine  [facility]  [facility] EMERGENCY DEPARTMENT  [address] [place] [id number]  [phone]  Dept: [phone]     Dimitrios Leslie MD  05/25/24 0910

## 2024-05-22 NOTE — ED NOTES
Pt states daily cocaine use for at least one year, snorts through nostrils. Pain has been increasing over last two weeks, came in today bc pain became unbearable. Pt has tried multiple methods for pain relief including hot packs, steam, tea, saline rinse, aquaphor, cough drops, more cocaine ingestion.     Pt believes that nostril pain is related to his ongoing cocaine use. In regards to cessation, pt states he knows what he needs to do to quit. Does not want resources for cessation today, only concerned w/pain relief for this visit.

## 2024-05-22 NOTE — ED NOTES
Paper prescriptions found for the pt, attempts to contact the pt were made but were unsuccessful. Prescriptions were given to ED charge nurse.

## 2024-09-04 ENCOUNTER — OFFICE VISIT (OUTPATIENT)
Dept: FAMILY MEDICINE | Facility: CLINIC | Age: 34
End: 2024-09-04
Payer: COMMERCIAL

## 2024-09-04 VITALS
OXYGEN SATURATION: 97 % | BODY MASS INDEX: 23.62 KG/M2 | DIASTOLIC BLOOD PRESSURE: 69 MMHG | HEART RATE: 77 BPM | HEIGHT: 70 IN | WEIGHT: 165 LBS | SYSTOLIC BLOOD PRESSURE: 113 MMHG | TEMPERATURE: 97.3 F | RESPIRATION RATE: 20 BRPM

## 2024-09-04 DIAGNOSIS — J30.1 SEASONAL ALLERGIC RHINITIS DUE TO POLLEN: ICD-10-CM

## 2024-09-04 DIAGNOSIS — M65.4 DE QUERVAIN'S DISEASE (TENOSYNOVITIS): ICD-10-CM

## 2024-09-04 DIAGNOSIS — F32.1 CURRENT MODERATE EPISODE OF MAJOR DEPRESSIVE DISORDER WITHOUT PRIOR EPISODE (H): ICD-10-CM

## 2024-09-04 DIAGNOSIS — M25.531 RIGHT WRIST PAIN: ICD-10-CM

## 2024-09-04 DIAGNOSIS — F41.1 GAD (GENERALIZED ANXIETY DISORDER): Primary | ICD-10-CM

## 2024-09-04 PROCEDURE — 99214 OFFICE O/P EST MOD 30 MIN: CPT | Mod: GC

## 2024-09-04 RX ORDER — NAPROXEN 500 MG/1
500 TABLET ORAL 2 TIMES DAILY WITH MEALS
Qty: 14 TABLET | Refills: 0 | Status: SHIPPED | OUTPATIENT
Start: 2024-09-04

## 2024-09-04 RX ORDER — SERTRALINE HYDROCHLORIDE 100 MG/1
100 TABLET, FILM COATED ORAL DAILY
Qty: 30 TABLET | Refills: 0 | Status: SHIPPED | OUTPATIENT
Start: 2024-09-04 | End: 2024-09-05

## 2024-09-04 RX ORDER — LORATADINE 10 MG/1
10 TABLET ORAL DAILY
Qty: 90 TABLET | Refills: 3 | Status: SHIPPED | OUTPATIENT
Start: 2024-09-04 | End: 2024-09-05

## 2024-09-04 RX ORDER — HYDROXYZINE PAMOATE 25 MG/1
25 CAPSULE ORAL 3 TIMES DAILY PRN
Qty: 90 CAPSULE | Refills: 1 | Status: SHIPPED | OUTPATIENT
Start: 2024-09-04 | End: 2024-09-05

## 2024-09-04 RX ORDER — HYDROXYZINE PAMOATE 25 MG/1
25 CAPSULE ORAL 3 TIMES DAILY PRN
COMMUNITY
End: 2024-09-04

## 2024-09-04 ASSESSMENT — PATIENT HEALTH QUESTIONNAIRE - PHQ9
5. POOR APPETITE OR OVEREATING: SEVERAL DAYS
SUM OF ALL RESPONSES TO PHQ QUESTIONS 1-9: 9

## 2024-09-04 ASSESSMENT — ANXIETY QUESTIONNAIRES
GAD7 TOTAL SCORE: 16
5. BEING SO RESTLESS THAT IT IS HARD TO SIT STILL: NEARLY EVERY DAY
GAD7 TOTAL SCORE: 16
3. WORRYING TOO MUCH ABOUT DIFFERENT THINGS: NEARLY EVERY DAY
7. FEELING AFRAID AS IF SOMETHING AWFUL MIGHT HAPPEN: NEARLY EVERY DAY
6. BECOMING EASILY ANNOYED OR IRRITABLE: NEARLY EVERY DAY
2. NOT BEING ABLE TO STOP OR CONTROL WORRYING: NEARLY EVERY DAY
1. FEELING NERVOUS, ANXIOUS, OR ON EDGE: NOT AT ALL
IF YOU CHECKED OFF ANY PROBLEMS ON THIS QUESTIONNAIRE, HOW DIFFICULT HAVE THESE PROBLEMS MADE IT FOR YOU TO DO YOUR WORK, TAKE CARE OF THINGS AT HOME, OR GET ALONG WITH OTHER PEOPLE: SOMEWHAT DIFFICULT

## 2024-09-04 NOTE — PROGRESS NOTES
Assessment & Plan     (F41.1) STACIE (generalized anxiety disorder)  (primary encounter diagnosis)  (F32.1) Current moderate episode of major depressive disorder without prior episode (H)  Comment: PHQ of 13 and STACIE of 16 today.  He currently sees a therapist virtually but is interested in seeing an in person therapist and wants a referral today.  He continues to take his Zoloft.  Plan: sertraline (ZOLOFT) 100 MG tablet, Adult Mental        Health  Referral        -Mental health referral for in person therapy   -Continue Zoloft    (J30.1) Seasonal allergic rhinitis due to pollen  Plan: hydrOXYzine mariusz (VISTARIL) 25 MG capsule,         loratadine (CLARITIN) 10 MG tablet            (M25.531) Right wrist pain  (M65.4) De Quervain's disease (tenosynovitis)  Comment: Patient with approximately 3 months of right-sided wrist pain.  It came on when he was in treatment and he thinks it came from when he pinned his wrist against the wall while sleeping.  It is characterized by the worst pain right before bed and when he wakes up.  The pain gets somewhat better when he uses his hand throughout the day but sometimes he gets worse from that as well.  He does notice slightly decreased strength in his right sided digits 1-3.  Normal range of motion and strength but does have a positive Phalen and Finkelstein's tests.  This pain is consistent with de Quervain's tenosynovitis as well as some mild carpal tunnel.  Will have him do an NSAID burst as well as a thumb spica splint for treatment.  Plan: naproxen (NAPROSYN) 500 MG tablet,         Wrist/Arm/Hand Bracing Supplies Order Wrist         Brace; Right; with thumb spica        -1 week NSAID burst   -Advised to wear brace is much as he can throughout the day and night but then can transition to nightly use as his symptoms improve   -Follow-up if not improving      No follow-ups on file.    Gurmeet Gould is a 34 year old, presenting for the following health  "issues:  Pain (Pain in right wrist and it feel tight. Starting 3 months ago. )      9/4/2024     3:05 PM   Additional Questions   Roomed by Edgar         9/4/2024    Information    services provided? No        HPI       1/2 PPD.     Right wrist hurts - 3 months ago    3 months ago. Mild wrist pain. Gets better throughout the day as he uses his hands. Drawing more than usual. Stable.     Feel it more at night.     Treatment - arm up against wall.       Cocaine addiction - mark off for a while.     Right wrist tight    Lipomas - 3 cut out     90 days.     13 and 16, pHQ and STACIE.           Objective    /69   Pulse 77   Temp 97.3  F (36.3  C) (Tympanic)   Resp 20   Ht 1.778 m (5' 10\")   Wt 74.8 kg (165 lb)   SpO2 97%   BMI 23.68 kg/m    Body mass index is 23.68 kg/m .  Physical Exam   GENERAL: Healthy, alert and no distress  EYES: Eyes grossly normal to inspection.  No discharge or erythema, or obvious scleral/conjunctival abnormalities.  MSK: Bilateral wrists with full range of motion and strength. Visually `normal plamar and dorsal aspect of both hands. Positive phalen and finkelstein test.  NEURO: Cranial nerves grossly intact.  Mentation and speech appropriate for age.  PSYCH: Mentation appears normal, affect normal/bright, judgement and insight intact, normal speech and appearance well-groomed.    Positive finkelstein's. Positive phalen.             Signed Electronically by: Lopez Shahid MD    DME (Durable Medical Equipment) Orders and Documentation  Orders Placed This Encounter   Procedures    Wrist/Arm/Hand Bracing Supplies Order Wrist Brace; Right; with thumb spica        The patient was assessed and it was determined the patient is in need of the following listed DME Supplies/Equipment. Please complete supporting documentation below to demonstrate medical necessity.         "

## 2024-09-04 NOTE — PROGRESS NOTES
Preceptor Attestation:   Patient seen, evaluated and discussed with the resident. I have verified the content of the note, which accurately reflects my assessment of the patient and the plan of care.    Supervising Physician:Larua Moreira MD    [facility]

## 2024-09-05 ENCOUNTER — TELEPHONE (OUTPATIENT)
Dept: FAMILY MEDICINE | Facility: CLINIC | Age: 34
End: 2024-09-05
Payer: COMMERCIAL

## 2024-09-05 DIAGNOSIS — J30.1 SEASONAL ALLERGIC RHINITIS DUE TO POLLEN: ICD-10-CM

## 2024-09-05 DIAGNOSIS — F32.1 CURRENT MODERATE EPISODE OF MAJOR DEPRESSIVE DISORDER WITHOUT PRIOR EPISODE (H): ICD-10-CM

## 2024-09-05 DIAGNOSIS — F41.1 GAD (GENERALIZED ANXIETY DISORDER): ICD-10-CM

## 2024-09-05 RX ORDER — HYDROXYZINE PAMOATE 25 MG/1
25 CAPSULE ORAL 3 TIMES DAILY PRN
Qty: 90 CAPSULE | Refills: 1 | Status: SHIPPED | OUTPATIENT
Start: 2024-09-05

## 2024-09-05 RX ORDER — PRAZOSIN HYDROCHLORIDE 2 MG/1
2 CAPSULE ORAL AT BEDTIME
COMMUNITY
Start: 2024-07-17

## 2024-09-05 RX ORDER — SERTRALINE HYDROCHLORIDE 100 MG/1
100 TABLET, FILM COATED ORAL DAILY
Qty: 30 TABLET | Refills: 0 | Status: SHIPPED | OUTPATIENT
Start: 2024-09-05 | End: 2024-10-02

## 2024-09-05 RX ORDER — LORATADINE 10 MG/1
10 TABLET ORAL DAILY
Qty: 90 TABLET | Refills: 3 | Status: SHIPPED | OUTPATIENT
Start: 2024-09-05

## 2024-09-05 NOTE — TELEPHONE ENCOUNTER
[facility] phone call message- medication clarification/question:    Full Medication Name:     - sertraline (ZOLOFT) 100 MG tablet     - hydrOXYzine mariusz (VISTARIL) 25 MG capsule     - loratadine (CLARITIN) 10 MG tablet         Question: Patient called unable to pick medications due to provider not registered with [facility]. Will route to preceptors to help resend. Thank you for your help.    Pharmacy confirmed as    [id number] - [place], [place] - [address] AT [address] [facility]: Yes    OK to leave a message on voice mail? Yes    Primary language: English      needed? No    Call taken on September 5, 2024 at 1:17 PM by Lindsey Stokes

## 2024-09-26 ENCOUNTER — TELEPHONE (OUTPATIENT)
Dept: CARE COORDINATION | Facility: CLINIC | Age: 34
End: 2024-09-26
Payer: COMMERCIAL

## 2024-09-26 NOTE — CONFIDENTIAL NOTE
SW called patient regarding Mental Health Referral for therapy this date. Patient reported having established therapist through:    [address] [id number] [phone]    Patient reported awaiting call back from therapy provider to schedule follow-up appointments. SW encouraged patient to contact [facility] is no response is received; patient vocalized agreement.    IRMA HORTON, COMFORT, [facility]

## 2024-10-02 DIAGNOSIS — F41.1 GAD (GENERALIZED ANXIETY DISORDER): ICD-10-CM

## 2024-10-02 DIAGNOSIS — F32.1 CURRENT MODERATE EPISODE OF MAJOR DEPRESSIVE DISORDER WITHOUT PRIOR EPISODE (H): ICD-10-CM

## 2024-10-02 RX ORDER — SERTRALINE HYDROCHLORIDE 100 MG/1
100 TABLET, FILM COATED ORAL DAILY
Qty: 30 TABLET | Refills: 0 | Status: SHIPPED | OUTPATIENT
Start: 2024-10-02

## 2024-10-02 NOTE — TELEPHONE ENCOUNTER
Outside RN standing orders due to out of range PHQ-9 and STACIE-7. Last resulted 09/04/24. PHQ-9 resulted at 9; STACIE-7 resulted at 16  Routing to PCP to review and fill. Onel YA

## 2024-10-04 ENCOUNTER — OFFICE VISIT (OUTPATIENT)
Dept: FAMILY MEDICINE | Facility: CLINIC | Age: 34
End: 2024-10-04
Payer: COMMERCIAL

## 2024-10-04 VITALS
SYSTOLIC BLOOD PRESSURE: 153 MMHG | RESPIRATION RATE: 16 BRPM | OXYGEN SATURATION: 99 % | TEMPERATURE: 97.9 F | HEART RATE: 88 BPM | DIASTOLIC BLOOD PRESSURE: 79 MMHG

## 2024-10-04 DIAGNOSIS — T78.49XA: Primary | ICD-10-CM
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
## 2024-10-04 DIAGNOSIS — L50.9 HIVES: ICD-10-CM

## 2024-10-04 PROCEDURE — 99213 OFFICE O/P EST LOW 20 MIN: CPT | Performed by: FAMILY MEDICINE

## 2024-10-04 RX ORDER — METHYLPREDNISOLONE 4 MG/1
TABLET ORAL
Qty: 21 TABLET | Refills: 0 | Status: SHIPPED | OUTPATIENT
Start: 2024-10-04

## 2024-10-04 NOTE — PROGRESS NOTES
Assessment & Plan     Allergic reaction to flea bite  Hives  - methylPREDNISolone (MEDROL DOSEPAK) 4 MG tablet therapy pack  Dispense: 21 tablet; Refill: 0    You have at home loratadine and you purchased Zyrtec, please choose one to take. Do not take both.  You have hydroxyzine and you purchased Benadryl, please choose one to take. Do not take both.    Soak leg in luke-warm water and oatmeal-based soaps (Aveeno). Ok to use Calamine and/or Benadryl creams.     If develop fevers or redness worsens over the weekend, please go to urgent care. Red area was outlined in pen.        No follow-ups on file.    Gurmeet Gould is a 34 year old, presenting for the following health issues:  Derm Problem (Legs are itching and swollen - right leg pet had fleas)    HPI     Bilateral legs are itchy, right lower leg is swollen, he states he had one spot on his leg that was itchy 3 days ago, he noticed his dog has had fleas and he is concerned he had some flea bites and he is having a reaction. He just picked up Benadryl and Zyrtec and Benadryl gel and Calamine lotion to help with his intense itching. No fevers.  Has a peanut allergy and seasonal allergies.                    Objective    BP (!) 153/79   Pulse 88   Temp 97.9  F (36.6  C) (Tympanic)   Resp 16   SpO2 99%   There is no height or weight on file to calculate BMI.  Physical Exam   GENERAL: alert and no distress  SKIN: Right lower leg at anterior tibia, circular spot that is dark red and irritated, the foot is red, raised lesions, the great toe does have some bruising at the nailbed  Left lateral foot with raised red linear pattern          Signed Electronically by: Marta Clinton, DO

## 2024-10-04 NOTE — PATIENT INSTRUCTIONS
You have at home loratadine and you purchased Zyrtec, please choose one to take. Do not take both.  You have hydroxyzine and you purchased Benadryl, please choose one to take. Do not take both.    Soak leg in luke-warm water and oatmeal-based soaps (Aveeno). Ok to use Calamine and/or Benadryl creams.     If develop fevers or redness worsens over the weekend, please go to urgent care.

## 2024-10-08 ENCOUNTER — OFFICE VISIT (OUTPATIENT)
Dept: FAMILY MEDICINE | Facility: CLINIC | Age: 34
End: 2024-10-08
Payer: COMMERCIAL

## 2024-10-08 VITALS
WEIGHT: 166 LBS | BODY MASS INDEX: 23.82 KG/M2 | DIASTOLIC BLOOD PRESSURE: 78 MMHG | RESPIRATION RATE: 16 BRPM | OXYGEN SATURATION: 99 % | SYSTOLIC BLOOD PRESSURE: 132 MMHG | HEART RATE: 80 BPM

## 2024-10-08 DIAGNOSIS — F14.10 COCAINE USE DISORDER (H): Primary | ICD-10-CM

## 2024-10-08 PROCEDURE — 99212 OFFICE O/P EST SF 10 MIN: CPT | Performed by: FAMILY MEDICINE

## 2024-10-08 NOTE — PROGRESS NOTES
Assessment & Plan     Luis Fernando Martinez is a 34 year old individual seen today for return to work evaluation    (F14.10) Cocaine use disorder (H) in remission  (primary encounter diagnosis)  Comment: Underwent inpatient therapy and continues to maintain non-use. No contraindications to return to work, letter sent.     Benjamin Rosenstein, MD, MA  [facility]     This note was completed with the assistance of dictation software. Typos and word substitution-errors are expected and unintended.        Subjective   Luis Fernando is a 34 year old, presenting for the following health issues:  Letter for School/Work (Letter to return to work/)    HPI     Was in treatment from June 14 - July 11. He has been able to search for employment since then. He's had no injuries or other limitations.         Objective    /78   Pulse 80   Resp 16   Wt 75.3 kg (166 lb)   SpO2 99%   BMI 23.82 kg/m    Body mass index is 23.82 kg/m .  Physical Exam   GENERAL: alert and no distress  RESP: Normal respiratory effort  MS: no gross musculoskeletal defects noted, no edema  PSYCH: mentation appears normal, affect normal/bright          Signed Electronically by: Benjamin Rosenstein, MD

## 2024-10-08 NOTE — LETTER
October 8, 2024      Luis Fernando Martinez  [address]  [place] [address]      To whom it may concern:    Luis Fernando Martinez was evaluate today in clinic. He may return to work without restrictions or limitations.     Please call if you have any questions or concerns.     Thank you         Benjamin Rosenstein, MD, MA  [facility]

## 2024-10-11 ENCOUNTER — TELEPHONE (OUTPATIENT)
Dept: FAMILY MEDICINE | Facility: CLINIC | Age: 34
End: 2024-10-11
Payer: COMMERCIAL

## 2024-10-11 NOTE — TELEPHONE ENCOUNTER
This sounds like a release of information being requested by unemployment  and may be specific to the providers at his treatment center (since there is a request for a case number). I recommend further information or forms be sent to the clinic to review.     Benjamin Rosenstein, MD, MA

## 2024-10-11 NOTE — TELEPHONE ENCOUNTER
Patient called today stating that the letter Dr Rosenstein wrote for him on 10/8 needs to state that he was cleared to work on July 11, 2024 due to his being in treatment at that date. This is a letter he is using to obtain Unemployment.  It needs to contain his case # [id number] and he wants it faxed to [phone]. Please write if appropriate and you can give to [facility] or [facility] to fax.  Thank you

## 2024-10-14 NOTE — TELEPHONE ENCOUNTER
Called patient but had to leave a message -- relayed information from Dr Rosenstein asking for forms to be sent to clinic.

## 2024-10-14 NOTE — TELEPHONE ENCOUNTER
Patient called back stating he is needing this for unemployment, he needs dates back tracked to be put on letter. I informed him I can re- clarify with Dr. Rosenstein but if he didn't see you to clear you on 7/11/24 I'm not sure how he can clear you? Patient also stated there is no form to be sent or filled, he just needs the letter fixed. Please advise if this patient is needing a appointment to further discuss?

## 2024-12-22 ENCOUNTER — APPOINTMENT (OUTPATIENT)
Dept: CT IMAGING | Facility: HOSPITAL | Age: 34
End: 2024-12-22
Attending: EMERGENCY MEDICINE
Payer: COMMERCIAL

## 2024-12-22 ENCOUNTER — HOSPITAL ENCOUNTER (EMERGENCY)
Facility: HOSPITAL | Age: 34
Discharge: HOME | End: 2024-12-22
Attending: EMERGENCY MEDICINE
Payer: COMMERCIAL

## 2024-12-22 VITALS
OXYGEN SATURATION: 100 % | RESPIRATION RATE: 16 BRPM | BODY MASS INDEX: 22.93 KG/M2 | WEIGHT: 154.8 LBS | TEMPERATURE: 97.9 F | HEART RATE: 97 BPM | DIASTOLIC BLOOD PRESSURE: 66 MMHG | SYSTOLIC BLOOD PRESSURE: 123 MMHG | HEIGHT: 69 IN

## 2024-12-22 DIAGNOSIS — N17.9 ACUTE KIDNEY INJURY (H): ICD-10-CM

## 2024-12-22 DIAGNOSIS — N20.1 URETEROLITHIASIS: ICD-10-CM

## 2024-12-22 LAB
ALBUMIN SERPL BCG-MCNC: 4.2 G/DL (ref 3.5–5.2)
ALBUMIN UR-MCNC: 10 MG/DL
ALP SERPL-CCNC: 91 U/L (ref 40–150)
ALT SERPL W P-5'-P-CCNC: 10 U/L (ref 0–70)
ANION GAP SERPL CALCULATED.3IONS-SCNC: 10 MMOL/L (ref 7–15)
APPEARANCE UR: CLEAR
AST SERPL W P-5'-P-CCNC: 15 U/L (ref 0–45)
BASOPHILS # BLD AUTO: 0 10E3/UL (ref 0–0.2)
BASOPHILS NFR BLD AUTO: 0 %
BILIRUB SERPL-MCNC: 0.5 MG/DL
BILIRUB UR QL STRIP: NEGATIVE
BUN SERPL-MCNC: 14.8 MG/DL (ref 6–20)
CALCIUM SERPL-MCNC: 9.5 MG/DL (ref 8.8–10.4)
CAOX CRY #/AREA URNS HPF: ABNORMAL /HPF
CHLORIDE SERPL-SCNC: 100 MMOL/L (ref 98–107)
COLOR UR AUTO: YELLOW
CREAT SERPL-MCNC: 1.71 MG/DL (ref 0.67–1.17)
EGFRCR SERPLBLD CKD-EPI 2021: 54 ML/MIN/1.73M2
EOSINOPHIL # BLD AUTO: 0.4 10E3/UL (ref 0–0.7)
EOSINOPHIL NFR BLD AUTO: 3 %
ERYTHROCYTE [DISTWIDTH] IN BLOOD BY AUTOMATED COUNT: 13.2 % (ref 10–15)
GLUCOSE SERPL-MCNC: 137 MG/DL (ref 70–99)
GLUCOSE UR STRIP-MCNC: NEGATIVE MG/DL
HCO3 SERPL-SCNC: 27 MMOL/L (ref 22–29)
HCT VFR BLD AUTO: 42.9 % (ref 40–53)
HGB BLD-MCNC: 14.1 G/DL (ref 13.3–17.7)
HGB UR QL STRIP: NEGATIVE
IMM GRANULOCYTES # BLD: 0 10E3/UL
IMM GRANULOCYTES NFR BLD: 0 %
KETONES UR STRIP-MCNC: NEGATIVE MG/DL
LEUKOCYTE ESTERASE UR QL STRIP: NEGATIVE
LIPASE SERPL-CCNC: 15 U/L (ref 13–60)
LYMPHOCYTES # BLD AUTO: 3.6 10E3/UL (ref 0.8–5.3)
LYMPHOCYTES NFR BLD AUTO: 32 %
MCH RBC QN AUTO: 27.6 PG (ref 26.5–33)
MCHC RBC AUTO-ENTMCNC: 32.9 G/DL (ref 31.5–36.5)
MCV RBC AUTO: 84 FL (ref 78–100)
MONOCYTES # BLD AUTO: 0.9 10E3/UL (ref 0–1.3)
MONOCYTES NFR BLD AUTO: 8 %
MUCOUS THREADS #/AREA URNS LPF: PRESENT /LPF
NEUTROPHILS # BLD AUTO: 6.5 10E3/UL (ref 1.6–8.3)
NEUTROPHILS NFR BLD AUTO: 57 %
NITRATE UR QL: NEGATIVE
NRBC # BLD AUTO: 0 10E3/UL
NRBC BLD AUTO-RTO: 0 /100
PH UR STRIP: 6 [PH] (ref 5–7)
PLATELET # BLD AUTO: 270 10E3/UL (ref 150–450)
POTASSIUM SERPL-SCNC: 3.8 MMOL/L (ref 3.4–5.3)
PROT SERPL-MCNC: 7 G/DL (ref 6.4–8.3)
RBC # BLD AUTO: 5.1 10E6/UL (ref 4.4–5.9)
RBC URINE: 1 /HPF
SODIUM SERPL-SCNC: 137 MMOL/L (ref 135–145)
SP GR UR STRIP: 1.03 (ref 1–1.03)
SQUAMOUS EPITHELIAL: 1 /HPF
UROBILINOGEN UR STRIP-MCNC: <2 MG/DL
WBC # BLD AUTO: 11.4 10E3/UL (ref 4–11)
WBC URINE: 3 /HPF

## 2024-12-22 PROCEDURE — 85004 AUTOMATED DIFF WBC COUNT: CPT | Performed by: EMERGENCY MEDICINE

## 2024-12-22 PROCEDURE — 82310 ASSAY OF CALCIUM: CPT | Performed by: EMERGENCY MEDICINE

## 2024-12-22 PROCEDURE — 83690 ASSAY OF LIPASE: CPT | Performed by: EMERGENCY MEDICINE

## 2024-12-22 PROCEDURE — 250N000011 HC RX IP 250 OP 636: Performed by: EMERGENCY MEDICINE

## 2024-12-22 PROCEDURE — 96374 THER/PROPH/DIAG INJ IV PUSH: CPT

## 2024-12-22 PROCEDURE — 81001 URINALYSIS AUTO W/SCOPE: CPT | Performed by: EMERGENCY MEDICINE

## 2024-12-22 PROCEDURE — 96375 TX/PRO/DX INJ NEW DRUG ADDON: CPT

## 2024-12-22 PROCEDURE — 99285 EMERGENCY DEPT VISIT HI MDM: CPT | Mod: 25

## 2024-12-22 PROCEDURE — 36415 COLL VENOUS BLD VENIPUNCTURE: CPT | Performed by: EMERGENCY MEDICINE

## 2024-12-22 PROCEDURE — 74176 CT ABD & PELVIS W/O CONTRAST: CPT

## 2024-12-22 RX ORDER — ONDANSETRON 4 MG/1
4 TABLET, ORALLY DISINTEGRATING ORAL EVERY 6 HOURS PRN
Qty: 20 TABLET | Refills: 0 | Status: SHIPPED | OUTPATIENT
Start: 2024-12-22

## 2024-12-22 RX ORDER — MORPHINE SULFATE 4 MG/ML
4 INJECTION, SOLUTION INTRAMUSCULAR; INTRAVENOUS ONCE
Status: COMPLETED | OUTPATIENT
Start: 2024-12-22 | End: 2024-12-22

## 2024-12-22 RX ORDER — ONDANSETRON 2 MG/ML
4 INJECTION INTRAMUSCULAR; INTRAVENOUS ONCE
Status: COMPLETED | OUTPATIENT
Start: 2024-12-22 | End: 2024-12-22

## 2024-12-22 RX ORDER — KETOROLAC TROMETHAMINE 15 MG/ML
15 INJECTION, SOLUTION INTRAMUSCULAR; INTRAVENOUS ONCE
Status: COMPLETED | OUTPATIENT
Start: 2024-12-22 | End: 2024-12-22

## 2024-12-22 RX ORDER — ACETAMINOPHEN 500 MG
1000 TABLET ORAL EVERY 6 HOURS
Qty: 56 TABLET | Refills: 0 | Status: SHIPPED | OUTPATIENT
Start: 2024-12-22 | End: 2024-12-29

## 2024-12-22 RX ORDER — IBUPROFEN 200 MG
400 TABLET ORAL EVERY 6 HOURS
Qty: 56 TABLET | Refills: 0 | Status: SHIPPED | OUTPATIENT
Start: 2024-12-22 | End: 2024-12-22 | Stop reason: ALTCHOICE

## 2024-12-22 RX ORDER — OXYCODONE HYDROCHLORIDE 5 MG/1
5 TABLET ORAL EVERY 4 HOURS PRN
Qty: 8 TABLET | Refills: 0 | Status: SHIPPED | OUTPATIENT
Start: 2024-12-22 | End: 2024-12-26

## 2024-12-22 RX ORDER — DIMENHYDRINATE 50 MG
50 TABLET ORAL EVERY 6 HOURS PRN
Qty: 28 TABLET | Refills: 0 | Status: SHIPPED | OUTPATIENT
Start: 2024-12-22 | End: 2024-12-29

## 2024-12-22 RX ADMIN — MORPHINE SULFATE 4 MG: 4 INJECTION, SOLUTION INTRAMUSCULAR; INTRAVENOUS at 01:04

## 2024-12-22 RX ADMIN — ONDANSETRON 4 MG: 2 INJECTION INTRAMUSCULAR; INTRAVENOUS at 01:02

## 2024-12-22 RX ADMIN — KETOROLAC TROMETHAMINE 15 MG: 15 INJECTION, SOLUTION INTRAMUSCULAR; INTRAVENOUS at 00:58

## 2024-12-22 ASSESSMENT — ACTIVITIES OF DAILY LIVING (ADL)
ADLS_ACUITY_SCORE: 41

## 2024-12-22 NOTE — ED TRIAGE NOTES
Patient began having right flank pain about 36 hours ago. Patient endorses nausea. Pain has gotten progressively worse.     Triage Assessment (Adult)       Row Name 12/22/24 0022          Triage Assessment    Airway WDL WDL        Respiratory WDL    Respiratory WDL WDL        Skin Circulation/Temperature WDL    Skin Circulation/Temperature WDL WDL        Cardiac WDL    Cardiac WDL WDL        Peripheral/Neurovascular WDL    Peripheral Neurovascular WDL WDL        Cognitive/Neuro/Behavioral WDL    Cognitive/Neuro/Behavioral WDL WDL

## 2024-12-22 NOTE — DISCHARGE INSTRUCTIONS
You were seen in the emergency department for right flank pain and found to have a couple of small kidney stones accounting for your symptoms.  We discussed having you follow-up with the urology clinic after this emergency department and have placed a referral.  A  should be reaching out to you early this week to help you confirm a follow-up appointment.  The stones are small enough that they are very likely to pass on their own.  Please review the attached instructions from a urology group with particular attention to the pain management section.  Your kidney function is a little bit worse during this time, probably related to the stones and also some dehydration.  We are going to have you avoid ibuprofen due to the potential for kidney injury related to this.  Make sure that you are drinking plenty of liquids to keep yourself well-hydrated during this illness.  We would need to see you back in the emergency department if you have other concerns like fever or intractable vomiting.

## 2024-12-22 NOTE — ED PROVIDER NOTES
EMERGENCY DEPARTMENT ENCOUNTER      NAME: Luis Fernando Martinez  AGE: 34 year old male  YOB: 1990  MRN: [id number]  EVALUATION DATE & TIME: 2024 12:23 AM    PCP: Tremayne Ramirez    ED PROVIDER: Du Agrawal M.D.      Chief Complaint   Patient presents with    Flank Pain         FINAL IMPRESSION:  1. Ureterolithiasis    2. Acute kidney injury (H)          ED COURSE & MEDICAL DECISION MAKIN year old male presents to the Emergency Department for evaluation of right flank pain.  Patient with a history of kidney stone disease presenting with about a day and a half of right flank pain.  He arrives to the emergency department borderline tachycardic but otherwise vitally stable.  He underwent lab and imaging evaluation.  CT does show a right distal ureteral stone causing some hydronephrosis.  Patient does have a moderate acute kidney injury, likely some combination of obstruction, dehydration, and NSAID use.  We discussed having him avoid ibuprofen and using Tylenol, Dramamine, and limited oxycodone as needed for breakthrough pain.  Patient resting much more comfortably after initial ED interventions.  Discussed a plan for urology clinic follow-up and patient was in agreement.  Return precautions reviewed and referral was placed.      ED COURSE:  12:30 AM Introduced myself to the patient, obtained history of present illness, and performed initial physical exam at this time.         At the conclusion of the encounter I discussed the results of all of the tests and the disposition. The questions were answered. The patient or family acknowledged understanding and was agreeable with the care plan.       Medical Decision Making  Obtained supplemental history:Supplemental history obtained?: No  Reviewed external records: External records reviewed?: Outpatient Record: 2022 [facility] ED visit  Care impacted by chronic illness:Alcohol and/or Drug Abuse or Dependence  Did you consider but not  order tests?: Work up considered but not performed and documented in chart, if applicable  Did you interpret images independently?: Independent interpretation of ECG and images noted in documentation, when applicable.  Consultation discussion with other provider:Did you involve another provider (consultant, , pharmacy, etc.)?: No  Discharge. I prescribed additional prescription strength medication(s) as charted. N/A.    MIPS: Not Applicable          MEDICATIONS GIVEN IN THE EMERGENCY:  Medications   ketorolac (TORADOL) injection 15 mg (15 mg Intravenous $Given 12/22/24 0058)   morphine (PF) injection 4 mg (4 mg Intravenous $Given 12/22/24 0104)   ondansetron (ZOFRAN) injection 4 mg (4 mg Intravenous $Given 12/22/24 0102)       NEW PRESCRIPTIONS STARTED AT TODAY'S ER VISIT  New Prescriptions    ACETAMINOPHEN (TYLENOL) 500 MG TABLET    Take 2 tablets (1,000 mg) by mouth every 6 hours for 7 days.    DIMENHYDRINATE (DRAMAMINE) 50 MG TABLET    Take 1 tablet (50 mg) by mouth every 6 hours as needed for other (kidney stone pain management).    ONDANSETRON (ZOFRAN ODT) 4 MG ODT TAB    Take 1 tablet (4 mg) by mouth every 6 hours as needed for nausea or vomiting.    OXYCODONE (ROXICODONE) 5 MG TABLET    Take 1 tablet (5 mg) by mouth every 4 hours as needed for severe pain. If pain is not improved with acetaminophen and ibuprofen.          =================================================================    HPI    Patient information was obtained from: Patient.    Use of : N/A         Luis Fernando Martinez is a 34 year old male with a pertinent history of anxiety, polysubstance abuse, asthma, and depression who presents to this ED by private car for evaluation of flank pain.    Per chart review:   5/31/2022 Patient was seen at [facility] for evaluation of left upper quadrant abdominal pain. CT abdomen showed a 2.5 mm stone in the left proximal ureter just beyond the UPJ causing moderate hydronephrosis. Patient  discharged home in stable condition with Zofran, oxycodone, and Flomax.    Patient reports 1.5 days of right lower quadrant back pain that radiates to his abdomen. He endorses nausea, but denies vomiting or fevers. No h/o abdominal surgeries. Patient states he had a kidney stone about 2 years ago that he was able to pass and needed no interventions. Current pain and symptoms feel similar to that time. Patient also endorses dysuria and increased output, but denies hematuria. No other symptoms, complaints, or concerns at this time.       REVIEW OF SYSTEMS   All systems reviewed and negative except as noted in HPI.    PAST MEDICAL HISTORY:  No past medical history on file.    PAST SURGICAL HISTORY:  No past surgical history on file.        CURRENT MEDICATIONS:    No current facility-administered medications for this encounter.     Current Outpatient Medications   Medication Sig Dispense Refill    acetaminophen (TYLENOL) 500 MG tablet Take 2 tablets (1,000 mg) by mouth every 6 hours for 7 days. 56 tablet 0    dimenhyDRINATE (DRAMAMINE) 50 MG tablet Take 1 tablet (50 mg) by mouth every 6 hours as needed for other (kidney stone pain management). 28 tablet 0    ondansetron (ZOFRAN ODT) 4 MG ODT tab Take 1 tablet (4 mg) by mouth every 6 hours as needed for nausea or vomiting. 20 tablet 0    oxyCODONE (ROXICODONE) 5 MG tablet Take 1 tablet (5 mg) by mouth every 4 hours as needed for severe pain. If pain is not improved with acetaminophen and ibuprofen. 8 tablet 0    albuterol (PROAIR HFA/PROVENTIL HFA/VENTOLIN HFA) 108 (90 Base) MCG/ACT inhaler Inhale 2 puffs into the lungs every 4 hours as needed for shortness of breath or wheezing 18 g 3    EPINEPHrine (ANY BX GENERIC EQUIV) 0.3 MG/0.3ML injection 2-pack Inject 0.3 mLs (0.3 mg) into the muscle as needed for anaphylaxis 2 each 1    fluticasone (FLONASE) 50 MCG/ACT spray Spray 2 sprays into both nostrils daily 16 g 1    hydrOXYzine mariusz (VISTARIL) 25 MG capsule Take 1 capsule  (25 mg) by mouth 3 times daily as needed for itching. 90 capsule 1    lidocaine, viscous, (XYLOCAINE) 2 % solution Apply to painful areas inside nose with q tip every hour as needed for pain; Max 8 doses/24 hour period. 20 mL 0    loratadine (CLARITIN) 10 MG tablet Take 1 tablet (10 mg) by mouth daily. 90 tablet 3    methylPREDNISolone (MEDROL DOSEPAK) 4 MG tablet therapy pack Follow Package Directions 21 tablet 0    naproxen (NAPROSYN) 500 MG tablet Take 1 tablet (500 mg) by mouth 2 times daily (with meals). 14 tablet 0    prazosin (MINIPRESS) 2 MG capsule Take 2 mg by mouth at bedtime.      sertraline (ZOLOFT) 100 MG tablet TAKE 1 TABLET(100 MG) BY MOUTH DAILY 30 tablet 0         ALLERGIES:  Allergies   Allergen Reactions    Peanut-Containing Drug Products Anaphylaxis       FAMILY HISTORY:  No family history on file.    SOCIAL HISTORY:   Social History     Socioeconomic History    Marital status: Single   Tobacco Use    Smoking status: Every Day     Current packs/day: 1.00     Types: Cigarettes   Vaping Use    Vaping status: Every Day     Social Drivers of Health     Financial Resource Strain: Low Risk  (4/1/2024)    Financial Resource Strain     Within the past 12 months, have you or your family members you live with been unable to get utilities (heat, electricity) when it was really needed?: No   Food Insecurity: Low Risk  (4/1/2024)    Food Insecurity     Within the past 12 months, did you worry that your food would run out before you got money to buy more?: No     Within the past 12 months, did the food you bought just not last and you didn t have money to get more?: No   Transportation Needs: Low Risk  (4/1/2024)    Transportation Needs     Within the past 12 months, has lack of transportation kept you from medical appointments, getting your medicines, non-medical meetings or appointments, work, or from getting things that you need?: No   Interpersonal Safety: Low Risk  (10/4/2024)    Interpersonal Safety      "Do you feel physically and emotionally safe where you currently live?: Yes     Within the past 12 months, have you been hit, slapped, kicked or otherwise physically hurt by someone?: No     Within the past 12 months, have you been humiliated or emotionally abused in other ways by your partner or ex-partner?: No   Housing Stability: Low Risk  (4/1/2024)    Housing Stability     Do you have housing? : Yes     Are you worried about losing your housing?: No       VITALS:  /66   Pulse 104   Temp 97.9  F (36.6  C) (Temporal)   Resp 16   Ht 1.753 m (5' 9\")   Wt 70.2 kg (154 lb 12.8 oz)   SpO2 98%   BMI 22.86 kg/m      PHYSICAL EXAM    Constitutional: Well developed, Well nourished, NAD.  HENT: Normocephalic, Atraumatic. Neck Supple.  Eyes: EOMI, Conjunctiva normal.  Respiratory: Breathing comfortably on room air. Speaks full sentences easily. Lungs clear to ascultation.  Cardiovascular: Normal heart rate, Regular rhythm. No peripheral edema.  Abdomen: Soft, R CVA tenderness. Abdomen otherwise nontender.  Musculoskeletal: Good range of motion in all major joints. No major deformities noted.  Integument: Warm, Dry.  Neurologic: Alert & awake, Normal motor function, Normal sensory function, No focal deficits noted.   Psychiatric: Cooperative. Affect appropriate.     LAB:  All pertinent labs reviewed and interpreted.  Labs Ordered and Resulted from Time of ED Arrival to Time of ED Departure   COMPREHENSIVE METABOLIC PANEL - Abnormal       Result Value    Sodium 137      Potassium 3.8      Carbon Dioxide (CO2) 27      Anion Gap 10      Urea Nitrogen 14.8      Creatinine 1.71 (*)     GFR Estimate 54 (*)     Calcium 9.5      Chloride 100      Glucose 137 (*)     Alkaline Phosphatase 91      AST 15      ALT 10      Protein Total 7.0      Albumin 4.2      Bilirubin Total 0.5     ROUTINE UA WITH MICROSCOPIC REFLEX TO CULTURE - Abnormal    Color Urine Yellow      Appearance Urine Clear      Glucose Urine Negative      " Bilirubin Urine Negative      Ketones Urine Negative      Specific Gravity Urine 1.030      Blood Urine Negative      pH Urine 6.0      Protein Albumin Urine 10 (*)     Urobilinogen Urine <2.0      Nitrite Urine Negative      Leukocyte Esterase Urine Negative      Mucus Urine Present (*)     Calcium Oxalate Crystals Urine Few (*)     RBC Urine 1      WBC Urine 3      Squamous Epithelials Urine 1     CBC WITH PLATELETS AND DIFFERENTIAL - Abnormal    WBC Count 11.4 (*)     RBC Count 5.10      Hemoglobin 14.1      Hematocrit 42.9      MCV 84      MCH 27.6      MCHC 32.9      RDW 13.2      Platelet Count 270      % Neutrophils 57      % Lymphocytes 32      % Monocytes 8      % Eosinophils 3      % Basophils 0      % Immature Granulocytes 0      NRBCs per 100 WBC 0      Absolute Neutrophils 6.5      Absolute Lymphocytes 3.6      Absolute Monocytes 0.9      Absolute Eosinophils 0.4      Absolute Basophils 0.0      Absolute Immature Granulocytes 0.0      Absolute NRBCs 0.0     LIPASE - Normal    Lipase 15         RADIOLOGY:  Reviewed all pertinent imaging. Please see official radiology report.  Abd/pelvis CT no contrast - Stone Protocol   Final Result   IMPRESSION:    1.  Obstructive right distal ureteral calculi, just proximal to ureterovesicular junction measuring 2 x 2 by 1.5 mm with mild to moderate proximal hydroureter process. (Image 50, series 5; image 172, series 4).   2.  Nonobstructive bilateral renal collecting system calculi, the largest which is seen in the upper pole left kidney measuring 2 mm.   3.  Normal appendix                 I, Maggy Lugo, am serving as a scribe to document services personally performed by Dr. Du Agrawal, based on my observation and the provider's statements to me. I, Du Agrawal MD attest that Maggy Lugo is acting in a scribe capacity, has observed my performance of the services and has documented them in accordance with my direction.    Du Agrawal,  M.D.  Emergency Medicine  [facility] EMERGENCY DEPARTMENT  [address] [place] [id number]  [phone]  Dept: [phone]       Du Agrawal MD  12/22/24 8445

## 2024-12-26 ENCOUNTER — TELEPHONE (OUTPATIENT)
Dept: UROLOGY | Facility: CLINIC | Age: 34
End: 2024-12-26
Payer: COMMERCIAL

## 2024-12-26 DIAGNOSIS — N20.1 URETEROLITHIASIS: Primary | ICD-10-CM

## 2024-12-26 RX ORDER — OXYCODONE HYDROCHLORIDE 5 MG/1
5 TABLET ORAL EVERY 4 HOURS PRN
Qty: 8 TABLET | Refills: 0 | Status: SHIPPED | OUTPATIENT
Start: 2024-12-26

## 2024-12-26 NOTE — TELEPHONE ENCOUNTER
Medication Question or Refill        What medication are you calling about (include dose and sig)?:     oxyCODONE (ROXICODONE) 5 MG tablet     Preferred Pharmacy:     [id number] - [place], [place] - [address] [place] [address]  Phone: [phone] Fax: [phone]      Controlled Substance Agreement on file:   CSA -- Patient Level:    CSA: None found at the patient level.       Who prescribed the medication?: Du Agrawal MD     Do you need a refill? Yes    Patient offered an appointment? No    Do you have any questions or concerns?  Yes: patient was hoping to have this refilled. They have two kidney stones they need to pass yet

## 2024-12-26 NOTE — TELEPHONE ENCOUNTER
[facility] Call Center    Phone Message    May a detailed message be left on voicemail: yes     Reason for Call: Appointment Intake    Referring Provider Name: Du Agrawal  Diagnosis and/or Symptoms: kidney stones    Patient has emergency 1-2 day referral. He finished his last dose of pain meds today. Writer transferred him to PC dept to try get a refill. Pt is scheduled for first available on 1/7 but please try to get him in sooner. Patient is working on renewing his medical assistance for 2025. Please call pt asap. Thank you!    Action Taken: Message routed to:  [facility] Kimberly DAVIS    Travel Screening: Not Applicable     Date of Service:

## 2024-12-27 NOTE — TELEPHONE ENCOUNTER
Patient requesting refill of oxycodone for pain.   Seen in ED on 12/22/24 for ureterolithiasis and IRMA.   Patient reports he still has a couple stones to pass.   Reviewed PDMP, appropriate. Refill sent.       Tremayne Ramirez MD

## 2025-01-13 ENCOUNTER — TELEPHONE (OUTPATIENT)
Dept: SURGERY | Facility: CLINIC | Age: 35
End: 2025-01-13
Payer: COMMERCIAL

## 2025-01-13 NOTE — TELEPHONE ENCOUNTER
Health Call Center    Phone Message    May a detailed message be left on voicemail: yes     Reason for Call: Medication Question or concern regarding medication   Prescription Clarification  Name of Medication: sertraline (ZOLOFT) 100 MG tablet and prazosin (MINIPRESS) 2 MG capsule  Prescribing Provider: Ladi Borden   Pharmacy:    [id number] - [place], [place] - [address]    What on the order needs clarification? Patient is reporting that Ladi Borden is willing to renew these Rx's. Please call the patient back on this and when they are refilled.    **Patient is stating he is out of these medications by tonight 1-13-25.**    Action Taken: Message routed to:  [facility] PAC    Travel Screening: Not Applicable     Date of Service:

## 2025-01-14 DIAGNOSIS — F32.1 CURRENT MODERATE EPISODE OF MAJOR DEPRESSIVE DISORDER WITHOUT PRIOR EPISODE (H): ICD-10-CM

## 2025-01-14 DIAGNOSIS — F41.1 GAD (GENERALIZED ANXIETY DISORDER): ICD-10-CM

## 2025-01-14 NOTE — TELEPHONE ENCOUNTER
Updated Luis Fernando that Ladi Borden NP, has never seen him as a patient.  So advised that he reach out to the provider who originally prescribed these medications.  Luis Fernando believed it was a provider at [facility] in [place], so provided him with their phone number.  Luis Fernando expressed understanding.  Marta Mclean RN

## 2025-01-15 RX ORDER — SERTRALINE HYDROCHLORIDE 100 MG/1
100 TABLET, FILM COATED ORAL DAILY
Qty: 90 TABLET | Refills: 3 | Status: SHIPPED | OUTPATIENT
Start: 2025-01-15

## 2025-01-16 ENCOUNTER — TELEPHONE (OUTPATIENT)
Dept: UROLOGY | Facility: CLINIC | Age: 35
End: 2025-01-16
Payer: COMMERCIAL

## 2025-01-17 ENCOUNTER — OFFICE VISIT (OUTPATIENT)
Dept: FAMILY MEDICINE | Facility: CLINIC | Age: 35
End: 2025-01-17
Payer: COMMERCIAL

## 2025-01-17 VITALS
TEMPERATURE: 97.9 F | RESPIRATION RATE: 20 BRPM | SYSTOLIC BLOOD PRESSURE: 115 MMHG | HEART RATE: 113 BPM | OXYGEN SATURATION: 96 % | DIASTOLIC BLOOD PRESSURE: 69 MMHG | BODY MASS INDEX: 22.9 KG/M2 | HEIGHT: 70 IN | WEIGHT: 160 LBS

## 2025-01-17 DIAGNOSIS — Z96.0 S/P URETERAL STENT PLACEMENT: ICD-10-CM

## 2025-01-17 DIAGNOSIS — N20.1 URETEROLITHIASIS: Primary | ICD-10-CM

## 2025-01-17 DIAGNOSIS — F19.11 SUBSTANCE ABUSE IN REMISSION (H): ICD-10-CM

## 2025-01-17 RX ORDER — ACETAMINOPHEN 500 MG
500-1000 TABLET ORAL EVERY 6 HOURS PRN
COMMUNITY
Start: 2024-12-30

## 2025-01-17 RX ORDER — BUSPIRONE HYDROCHLORIDE 5 MG/1
5 TABLET ORAL DAILY
COMMUNITY
Start: 2025-01-14

## 2025-01-17 RX ORDER — BUPROPION HYDROCHLORIDE 150 MG/1
150 TABLET ORAL EVERY MORNING
COMMUNITY
Start: 2025-01-14

## 2025-01-17 RX ORDER — OXYBUTYNIN CHLORIDE 5 MG/1
5 TABLET ORAL 2 TIMES DAILY PRN
COMMUNITY
Start: 2024-12-30 | End: 2025-12-30

## 2025-01-17 RX ORDER — HYDROXYZINE HYDROCHLORIDE 25 MG/1
25 TABLET, FILM COATED ORAL EVERY 8 HOURS PRN
COMMUNITY
Start: 2024-06-29

## 2025-01-17 ASSESSMENT — ASTHMA QUESTIONNAIRES
ACT_TOTALSCORE: 23
ACT_TOTALSCORE: 23
QUESTION_4 LAST FOUR WEEKS HOW OFTEN HAVE YOU USED YOUR RESCUE INHALER OR NEBULIZER MEDICATION (SUCH AS ALBUTEROL): NOT AT ALL
QUESTION_3 LAST FOUR WEEKS HOW OFTEN DID YOUR ASTHMA SYMPTOMS (WHEEZING, COUGHING, SHORTNESS OF BREATH, CHEST TIGHTNESS OR PAIN) WAKE YOU UP AT NIGHT OR EARLIER THAN USUAL IN THE MORNING: ONCE OR TWICE
QUESTION_5 LAST FOUR WEEKS HOW WOULD YOU RATE YOUR ASTHMA CONTROL: WELL CONTROLLED
QUESTION_1 LAST FOUR WEEKS HOW MUCH OF THE TIME DID YOUR ASTHMA KEEP YOU FROM GETTING AS MUCH DONE AT WORK, SCHOOL OR AT HOME: NONE OF THE TIME
QUESTION_2 LAST FOUR WEEKS HOW OFTEN HAVE YOU HAD SHORTNESS OF BREATH: NOT AT ALL

## 2025-01-17 ASSESSMENT — PATIENT HEALTH QUESTIONNAIRE - PHQ9
SUM OF ALL RESPONSES TO PHQ QUESTIONS 1-9: 3
SUM OF ALL RESPONSES TO PHQ QUESTIONS 1-9: 3
10. IF YOU CHECKED OFF ANY PROBLEMS, HOW DIFFICULT HAVE THESE PROBLEMS MADE IT FOR YOU TO DO YOUR WORK, TAKE CARE OF THINGS AT HOME, OR GET ALONG WITH OTHER PEOPLE: SOMEWHAT DIFFICULT

## 2025-01-17 NOTE — PROGRESS NOTES
Assessment & Plan     Ureterolithiasis  S/P ureteral stent placement  Patient presents to the clinic for hospital follow up. Patient was admitted to [facility] 12/28-12/30 for symptomatic right ureteral nephrolithiasis now s/p ureteral stent placement. Patient reports feeling improved since discharge. He states he passed the kidney stone and it was collected for further analysis. Had repeat CT A&P 1/17 which showed  interval placement of right double-J ureteral stent. No radiodense kidney/ureteral stones or hydronephrosis in either kidney.  Patient has upcoming appointment with urology 1/27. Med rec completed during visit. Patient is no longer requiring opioids for pain management. Patient is worried about procedure for stent replacement/removal. Reassurance provided.     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 Last used prior to recent admission. Congratulated patient and encouraged continued sobriety. Patient is coping well.       MED REC REQUIRED  Post Medication Reconciliation Status: discharge medications reconciled and changed, per note/orders    Return in about 2 months (around 3/17/2025) for Routine preventive.    Gurmeet Gould is a 35 year old, presenting for the following health issues:  Hospital F/U (Kidney stone ) and flu and covid     Hospital Follow-up Visit:    Hospital/Nursing Home/IP Rehab Facility:  [facility]   Date of Admission: 12/28/2024  Date of Discharge: 12/30/2024  Reason(s) for Admission: Symptomatic right ureteral nephrolithiasis   Was the patient in the ICU or did the patient experience delirium during hospitalization?  No  Do you have any other stressors you would like to discuss with your provider? No    Problems taking medications regularly:  None  Medication changes since discharge: None  Problems adhering to non-medication therapy:  None    Summary of hospitalization:  CareEverywhere  "information obtained and reviewed  Diagnostic Tests/Treatments reviewed.  Follow up needed: none  Other Healthcare Providers Involved in Patient s Care:         Specialist appointment - Urology   Update since discharge: improved.       Plan of care communicated with patient               Objective    /69   Pulse 113   Temp 97.9  F (36.6  C) (Oral)   Resp 20   Ht 1.78 m (5' 10.08\")   Wt 72.6 kg (160 lb)   SpO2 96%   BMI 22.91 kg/m    Body mass index is 22.91 kg/m .  Physical Exam   GEN: Pleasant male, in no acute distress.   HEENT: Normocephalic, atraumatic. Extraoccular eye movements intact. Anicteric sclera. Moist mucous membranes.   NECK: Supple. No cervical or supraclavicular adenopathy.   PULM: Non-labored breathing. No use of accessory muscles. Clear to ausculation bilaterally. No wheezes or crackles.   CV: Regular rate and rhythm. Normal S1, S2. No rubs, murmurs, or gallops.    ABDOMEN: Normoactive bowel sounds. Non-tender to palpation. Non-distended.    EXTREMITES:  No clubbing, cyanosis, or edema.    NEURO:  Awake. Oriented to person, place, time and situation. Cranial nerves 2-12 grossly intact. Moving all extremities.    PSYCH: Calm. Appropriate affect, insight, judgment.       CT Abdomen Pelvis w/o Contrast    Result Date: 1/17/2025  EXAM: CT ABDOMEN PELVIS W/O CONTRAST LOCATION: [facility] [place] DATE: 1/16/2025 INDICATION: Right ureteral stone. COMPARISON: CT abdomen and pelvis on 12/28/2024. TECHNIQUE: CT scan of the abdomen and pelvis was performed without intravenous contrast Multiplanar reformats were obtained. Dose reduction techniques were used. FINDINGS: LOWER CHEST: Lung bases are clear. ABDOMEN/PELVIS: Limited evaluation of the abdominal organs due to lack of intravenous contrast. HEPATOBILIARY: The unenhanced liver and gallbladder are grossly unremarkable. PANCREAS: The unenhanced pancreas is grossly unremarkable. SPLEEN: No splenomegaly. ADRENAL GLANDS: No " adrenal nodules. KIDNEYS/BLADDER: Interval placement of a right double-J ureteral stent extending from the right renal pelvis down to the urinary bladder. The previously seen distal right ureteral stones on 12/28/2024 exam are not seen on today's exam. No radiodense kidney/ureteral stones or hydronephrosis in either kidney. BOWEL: No abnormally dilated bowel loops. The appendix is visualized and appears normal. PERITONEUM: No evidence of free fluid in the abdomen and pelvis. No free peritoneal or portal venous gas. PELVIC ORGANS: Unremarkable. VASCULATURE: Unremarkable. LYMPH NODES: Unremarkable. MUSCULOSKELETAL: No suspicious osseous lesion.     IMPRESSION: 1.  Interval placement of right double-J ureteral stent. No radiodense kidney/ureteral stones or hydronephrosis in either kidney.          Signed Electronically by: Marian Malagon MD

## 2025-01-17 NOTE — PROGRESS NOTES
Preceptor Attestation:   Patient seen, evaluated and discussed with the resident Dr. Marian Malagon. I have verified the content of the note, which accurately reflects my assessment of the patient and the plan of care.    Supervising Physician:  Benjamin Rosenstein, MD, MA  [facility]  [facility]

## 2025-01-19 PROBLEM — R73.03 PREDIABETES: Status: ACTIVE | Noted: 2025-01-19

## 2025-01-24 DIAGNOSIS — N20.0 NEPHROLITHIASIS: Primary | ICD-10-CM

## 2025-01-25 ENCOUNTER — HOSPITAL ENCOUNTER (EMERGENCY)
Facility: HOSPITAL | Age: 35
Discharge: HOME | End: 2025-01-25
Payer: COMMERCIAL

## 2025-01-25 VITALS
BODY MASS INDEX: 24.48 KG/M2 | SYSTOLIC BLOOD PRESSURE: 109 MMHG | OXYGEN SATURATION: 98 % | TEMPERATURE: 98.2 F | HEIGHT: 69 IN | WEIGHT: 165.3 LBS | DIASTOLIC BLOOD PRESSURE: 61 MMHG | HEART RATE: 105 BPM | RESPIRATION RATE: 16 BRPM

## 2025-01-25 DIAGNOSIS — L03.012 PARONYCHIA OF FINGER OF LEFT HAND: ICD-10-CM

## 2025-01-25 DIAGNOSIS — L03.011 PARONYCHIA OF FINGER OF RIGHT HAND: ICD-10-CM

## 2025-01-25 PROBLEM — F15.20 METHAMPHETAMINE USE DISORDER, SEVERE, DEPENDENCE (H): Status: ACTIVE | Noted: 2024-12-30

## 2025-01-25 PROCEDURE — 99283 EMERGENCY DEPT VISIT LOW MDM: CPT

## 2025-01-25 PROCEDURE — 10060 I&D ABSCESS SIMPLE/SINGLE: CPT | Mod: F6

## 2025-01-25 ASSESSMENT — COLUMBIA-SUICIDE SEVERITY RATING SCALE - C-SSRS
1. IN THE PAST MONTH, HAVE YOU WISHED YOU WERE DEAD OR WISHED YOU COULD GO TO SLEEP AND NOT WAKE UP?: NO
6. HAVE YOU EVER DONE ANYTHING, STARTED TO DO ANYTHING, OR PREPARED TO DO ANYTHING TO END YOUR LIFE?: NO
2. HAVE YOU ACTUALLY HAD ANY THOUGHTS OF KILLING YOURSELF IN THE PAST MONTH?: NO

## 2025-01-25 ASSESSMENT — ACTIVITIES OF DAILY LIVING (ADL): ADLS_ACUITY_SCORE: 41

## 2025-01-25 NOTE — ED TRIAGE NOTES
Patient has fake nail on that he has had refilled a couple times, most recently filled nails 3 days. 4 Days ago he noted that some irritation with puss after cleaning out under the nails.   No fevers       Triage Assessment (Adult)       Row Name 01/25/25 1123          Triage Assessment    Airway WDL WDL        Respiratory WDL    Respiratory WDL WDL;X  multiple fingers, under nail bed, red and irrtated and pussy        Skin Circulation/Temperature WDL    Skin Circulation/Temperature WDL WDL        Cardiac WDL    Cardiac WDL WDL        Peripheral/Neurovascular WDL    Peripheral Neurovascular WDL WDL        Cognitive/Neuro/Behavioral WDL    Cognitive/Neuro/Behavioral WDL WDL

## 2025-01-25 NOTE — ED NOTES
Bed: [id number]  Expected date:   Expected time:   Means of arrival:   Comments:  Vertical 4/5

## 2025-01-25 NOTE — DISCHARGE INSTRUCTIONS
You likely have several infections in your fingers.  Please take the antibiotic as prescribed for the next week.    You can also use triple antibiotic ointment or bacitracin ointment which you can find over-the-counter in the fingers.  Please soak your hands in warm water for 15 minutes or so multiple times a day to allow this to heal.  Please have the nails removed.  Do not get your nails done until this infection is totally healed.  I have placed a referral to primary care.  Please see them early next week for reassessment and follow-up.    Come back here to the ER if you notice that the redness is spreading up your hand, you are unable to flex or  due to pain or weakness, you are developing fevers or other emergency symptoms.

## 2025-01-25 NOTE — ED PROVIDER NOTES
Emergency Department Encounter   NAME: Luis Fernando Martinez ; AGE: 35 year old male ; YOB: 1990 ; MRN: [id number] ; PCP: Tremayne Ramirez   ED PROVIDER: Lilo Edward PA-C    Evaluation Date & Time:   No admission date for patient encounter.    CHIEF COMPLAINT:  Finger Pain      Impression and Plan   MDM: Luis Fernando Martinez is a 35 year old male who presents to the ED for evaluation of finger infections that he has noticed over the last few days after getting a manicure with acrylic nails.  No systemic symptoms.  Patient appears well on initial exam with initial tachycardia of 128.  Afebrile.  There are several fingers on exam that appear to be draining purulent fluid on the underside from the nailbed.  Several digits also appear to have some area of minimal fluctuance around the nailbed notably around the second and third digits of both hands.    Differential diagnosis includes paronychia, felon, deeper infection such as flexor tenosynovitis, cellulitis.  The fingers noted with fluctuance were attempted to be incised and drained with an 18-gauge needle.  This resulted in minimal amount of purulence expressed from the wounds.  Without much drainage from the area, I suspect this is more swelling related to a cellulitis without an abscess or clear paronychia.  Considered a deeper infection such as flexor tenosynovitis however patient has normal range of motion, there is minimal erythema without any erythema or edema on the flexor surface of the hand.  Patient has appropriate strength and sensation without any diminished range of motion.  We discussed that patient should do warm soaks at home and will initiate antibiotic treatment Augmentin for probable paronychia.  Considered something on repeat moving the acrylic nails here however without the appropriate tools to do so I think this would ultimately cause more harm.  We discussed he should be seen at a nail salon for removal of the fake nails and avoid  having any fake nails or nail polish on the nails until the infection completely heals.  Discussed follow-up with a primary care provider early next week for reassessment.  Placed a referral for him.  Discussed that if the redness begins to spread up the hand, he develops a larger fluid-filled area, fevers or other systemic symptoms or concerns he can return here for reevaluation.    Continues to appear well throughout ED course.  Tachycardia resolved to 82 prior to discharge.  Patient will follow-up with primary care to take Augmentin and do warm soaks at home.  Discussed return precautions and patient expresses understanding and is discharged.     Medical Decision Making  Obtained supplemental history:Supplemental history obtained?: No  Reviewed external records: External records reviewed?: No  Care impacted by chronic illness:Smoking / Nicotine Use  Did you consider but not order tests?: Work up considered but not performed and documented in chart, if applicable  Did you interpret images independently?: Independent interpretation of ECG and images noted in documentation, when applicable.  Consultation discussion with other provider:Did you involve another provider (consultant, , pharmacy, etc.)?: No  Discharge. I prescribed additional prescription strength medication(s) as charted. See documentation for any additional details.    MIPS: Not Applicable      Critical Care: 0    ED COURSE:  1:03 PM I met and introduced myself to the patient. I gathered initial history and performed my physical exam. We discussed plan for initial workup.   1:15 PM I performed an incision and drainage procedure.     I rechecked the patient and discussed results, discharge, follow up, and reasons to return to the ED.     At the conclusion of the encounter I discussed the results of all the tests and the disposition. The questions were answered. The patient or family acknowledged understanding and was agreeable with the care  "plan.    FINAL IMPRESSION:    ICD-10-CM    1. Paronychia of finger of left hand  L03.012 Primary Care Referral      2. Paronychia of finger of right hand  L03.011 Primary Care Referral            MEDICATIONS GIVEN IN THE EMERGENCY DEPARTMENT:  Medications - No data to display      NEW PRESCRIPTIONS STARTED AT TODAY'S ED VISIT:  Discharge Medication List as of 1/25/2025  1:56 PM        START taking these medications    Details   amoxicillin-clavulanate (AUGMENTIN) 875-125 MG tablet Take 1 tablet by mouth 2 times daily for 7 days., Disp-14 tablet, R-0, E-Prescribe               HPI   Use of Intrepreter: N/A     Luis Fernando Martinez is a 35 year old male with a pertinent history of tobacco use disorder who presents to the ED by walk-in for evaluation of finger pain.    Patient had his nails redone 3-4 days ago at a nail salon in [place], [place]. 2-3 days ago, his left middle finger started to developed hives and itchiness. Patient uses a utensil to pick at and clean his nails, and he thinks this may have contributed to the symptoms spreading to all of his nails. His left middle and right pinky fingers developed drainage after patient took Benadryl. Patient also endorses tingling and \"almost itchiness\" at the tips of his fingers. The skin on his fingertips is tight and cracking. Cleaning under his nails makes his symptoms worse. Patient has used these nails before and denies any change to the glue that was used.  He notes drainage from several of his nails.    Patient also denies fever.    Medical History     History reviewed. No pertinent past medical history.    History reviewed. No pertinent surgical history.    No family history on file.    Social History     Tobacco Use    Smoking status: Every Day     Current packs/day: 1.00     Types: Cigarettes   Vaping Use    Vaping status: Every Day       amoxicillin-clavulanate (AUGMENTIN) 875-125 MG tablet  acetaminophen (TYLENOL) 500 MG tablet  albuterol (PROAIR HFA/PROVENTIL " "HFA/VENTOLIN HFA) 108 (90 Base) MCG/ACT inhaler  buPROPion (WELLBUTRIN XL) 150 MG 24 hr tablet  busPIRone (BUSPAR) 5 MG tablet  dimenhyDRINATE (DRAMAMINE) 50 MG tablet  EPINEPHrine (ANY BX GENERIC EQUIV) 0.3 MG/0.3ML injection 2-pack  fluticasone (FLONASE) 50 MCG/ACT spray  hydrOXYzine HCl (ATARAX) 25 MG tablet  hydrOXYzine mariusz (VISTARIL) 25 MG capsule  lidocaine, viscous, (XYLOCAINE) 2 % solution  loratadine (CLARITIN) 10 MG tablet  naproxen (NAPROSYN) 500 MG tablet  omeprazole (PRILOSEC) 20 MG DR capsule  ondansetron (ZOFRAN ODT) 4 MG ODT tab  oxyBUTYnin (DITROPAN) 5 MG tablet  oxyCODONE (ROXICODONE) 5 MG tablet  prazosin (MINIPRESS) 2 MG capsule  sertraline (ZOLOFT) 100 MG tablet  tamsulosin (FLOMAX) 0.4 MG capsule          Physical Exam     First Vitals:  Patient Vitals for the past 24 hrs:   BP Temp Temp src Pulse Resp SpO2 Height Weight   01/25/25 1400 109/61 -- -- 105 -- 98 % -- --   01/25/25 1357 124/60 98.2  F (36.8  C) Oral 82 16 97 % -- --   01/25/25 1126 115/61 98.8  F (37.1  C) -- (!) 128 16 97 % -- --   01/25/25 1123 -- -- -- -- -- -- 1.753 m (5' 9\") 75 kg (165 lb 4.8 oz)         PHYSICAL EXAM:   Physical Exam    Constitutional: alert,  no acute distress,  not ill-appearing  Head: normocephalic, atraumatic  Eyes: EOM intact   Neck: ROM normal  Cardio: tachycardic  Pulmonary: effort normal   Abdominal: flat, no distention  MSK:   -Several fingers with small noted areas of fluctuance to the nailbed, notably the second and third digits on both hands with drainage from under the nail  -Minimal erythema  -Normal strength and sensation  -Normal flexion  Skin: no visible rashes or erythema   Neuro: no gross focal deficit, acting per baseline   Psychiatric: mood and behavior within normal limit                Results     LAB:  All pertinent labs reviewed and interpreted  Labs Ordered and Resulted from Time of ED Arrival to Time of ED Departure - No data to display     RADIOLOGY:  No orders to display   . "     PROCEDURES:  PROCEDURE: Incision and Drainage   INDICATIONS: Localized abscess   PROCEDURE PROVIDER: Lilo Edward PA-C   SITE: Second and third digits on both hands   NOTE: The area of maximal fluctuance was opened with a 18-gauge needle to allow for drainage.  Minimal purulence drained from each site.   COMPLEXITY: Simple    Simple = single, furuncle, paronychia, superficial  Complex = multiple or abscess requiring probing, loculations, packing placement   COMPLICATIONS: Patient tolerated procedure well, without complication        I, Yaz Jean, am serving as a scribe to document services personally performed by Lilo Edward PA-C, based on my observation and the provider's statements to me. I, Lilo Edward PA-C attest that Yaz Jean is acting in a scribe capacity, has observed my performance of the services and has documented them in accordance with my direction.       Lilo Edward PA-C   Emergency Medicine   [facility] EMERGENCY DEPARTMENT       Lilo Edward PA-C  01/25/25 3534

## 2025-01-27 ENCOUNTER — TELEPHONE (OUTPATIENT)
Dept: FAMILY MEDICINE | Facility: OTHER | Age: 35
End: 2025-01-27

## 2025-01-27 NOTE — TELEPHONE ENCOUNTER
RN called to talk to patient about upcoming Virtual appointment, patient reports it was set up by the ER as a follow up appointment. Patient's primary care clinic is [facility], RN advised patient be seen at his primary clinic for this follow up, advised in person appointment. Patient verbalized understanding, transferred to central scheduling to assist with scheduling appointment in clinic this week.    Will close this encounter.    Neisha Mckenzie RN on 1/27/2025 at 3:28 PM

## 2025-01-27 NOTE — TELEPHONE ENCOUNTER
Sent encounter to triage, provider is unknown what the appointment is for or if its necessary    -Shana Rosado

## 2025-01-28 ENCOUNTER — VIRTUAL VISIT (OUTPATIENT)
Dept: UROLOGY | Facility: CLINIC | Age: 35
End: 2025-01-28
Payer: COMMERCIAL

## 2025-01-28 DIAGNOSIS — N20.1 RIGHT DISTAL URETERAL CALCULUS: Primary | ICD-10-CM

## 2025-01-28 DIAGNOSIS — Z96.0 S/P URETERAL STENT PLACEMENT: ICD-10-CM

## 2025-01-28 PROCEDURE — 98006 SYNCH AUDIO-VIDEO EST MOD 30: CPT | Performed by: NURSE PRACTITIONER

## 2025-01-28 NOTE — NURSING NOTE
Current patient location: [place]    Is the patient currently in the state of [place]? YES    Visit mode: VIDEO    If the visit is dropped, the patient can be reconnected by:VIDEO VISIT: Text to cell phone:   Telephone Information:   Mobile [phone]       Will anyone else be joining the visit? NO  (If patient encounters technical issues they should call [phone])    Are changes needed to the allergy or medication list? Patient completed e-check in for visit prior to appointment. VF did not review e-check in information again with patient due to this.     Are refills needed on medications prescribed by this physician? Discuss with provider    Rooming Documentation:  Not applicable    Reason for visit: RECHECK (Follow up )    Megan MOSELEY
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
## 2025-01-29 DIAGNOSIS — N20.0 NEPHROLITHIASIS: ICD-10-CM

## 2025-01-29 RX ORDER — TAMSULOSIN HYDROCHLORIDE 0.4 MG/1
0.4 CAPSULE ORAL DAILY
Qty: 30 CAPSULE | Refills: 0 | Status: SHIPPED | OUTPATIENT
Start: 2025-01-29

## 2025-01-30 ENCOUNTER — TELEPHONE (OUTPATIENT)
Dept: UROLOGY | Facility: CLINIC | Age: 35
End: 2025-01-30
Payer: COMMERCIAL

## 2025-01-30 NOTE — TELEPHONE ENCOUNTER
Message left for patient to call and discuss next follow up plan per provider.  X-ray shows no stone.  Patient is ok for office stent removal scheduling.

## 2025-02-03 ENCOUNTER — MYC REFILL (OUTPATIENT)
Dept: FAMILY MEDICINE | Facility: CLINIC | Age: 35
End: 2025-02-03
Payer: COMMERCIAL

## 2025-02-03 DIAGNOSIS — M25.531 RIGHT WRIST PAIN: ICD-10-CM

## 2025-02-03 RX ORDER — TAMSULOSIN HYDROCHLORIDE 0.4 MG/1
0.4 CAPSULE ORAL DAILY
Qty: 90 CAPSULE | OUTPATIENT
Start: 2025-02-03

## 2025-02-03 RX ORDER — NAPROXEN 500 MG/1
500 TABLET ORAL 2 TIMES DAILY WITH MEALS
Qty: 14 TABLET | Refills: 0 | Status: SHIPPED | OUTPATIENT
Start: 2025-02-03

## 2025-02-10 ENCOUNTER — OFFICE VISIT (OUTPATIENT)
Dept: UROLOGY | Facility: CLINIC | Age: 35
End: 2025-02-10
Payer: COMMERCIAL

## 2025-02-10 VITALS — HEART RATE: 72 BPM | DIASTOLIC BLOOD PRESSURE: 65 MMHG | TEMPERATURE: 97.6 F | SYSTOLIC BLOOD PRESSURE: 114 MMHG

## 2025-02-10 DIAGNOSIS — N20.0 NEPHROLITHIASIS: Primary | ICD-10-CM

## 2025-02-10 RX ORDER — CEPHALEXIN 500 MG/1
500 CAPSULE ORAL ONCE
Status: COMPLETED | OUTPATIENT
Start: 2025-02-10 | End: 2025-02-10

## 2025-02-10 RX ORDER — LIDOCAINE HYDROCHLORIDE 20 MG/ML
JELLY TOPICAL ONCE
Status: COMPLETED | OUTPATIENT
Start: 2025-02-10 | End: 2025-02-10

## 2025-02-10 RX ADMIN — CEPHALEXIN 500 MG: 500 CAPSULE ORAL at 15:36

## 2025-02-10 RX ADMIN — LIDOCAINE HYDROCHLORIDE 10 ML: 20 JELLY TOPICAL at 15:37

## 2025-02-10 ASSESSMENT — PAIN SCALES - GENERAL: PAINLEVEL_OUTOF10: NO PAIN (0)

## 2025-02-10 NOTE — PROGRESS NOTES
PRE-PROCEDURE DIAGNOSIS: right indwelling ureteral stent s/p right stent placement and stone passage  POST-PROCEDURE DIAGNOSIS: same  PROCEDURE: Cystoscopy with right ureteral stent removal  HISTORY: Mr. Martinez is a 35 year old male who is s/p right stent placement and stone passage.  Indweling ureteral stent was placed and they are here for office removal of the stent.  DESCRIPTION OF PROCEDURE: After informed consent was obtained, the patient was brought to the procedure room where he was placed in the supine position with all pressure points well padded.  The genitals were prepped and draped in a sterile fashion. A flexible cystoscope was introduced through a well-lubricated urethra. Anterior urethra strictures were present in (18 fr distal penile urethral and 20 fr bulbar). Non obstructing prostate.      The indwelling right stent was visualized, grasped and removed entirely confirmed by presence of two curls.   The bladder was not extensively visualized as this was done during the primary procedure.    The flexible cystoscope was removed and the findings were described to the patient.     ASSESSMENT AND PLAN: 35 year old male status post right stent placement and stone passage whose stent was successfully removed today.  Plan for 24 hr urine and follow-up visit with Joy Juan.  Discuss urethral stricture findings.

## 2025-02-10 NOTE — PROGRESS NOTES
Patient educated regarding stent removal procedure and possible symptoms after removal.  Patient voiced understanding of information.  Handout given to patient.  Consent form signed.    KSI Timeout    Correct patient?: Yes   Correct site?:  Yes  Correct procedure?:  Yes   Correct laterality?:  Right  Consents verified?:  Yes  Relevant lab results available?:  Yes    Clinic Administered Medication Documentation    Patient was given cephalexin 500mg one capsule and lidocaine jelly 2% per urethra. Prior to medication administration, verified patient's identity using patient's name and date of birth.    Eunice Roy CMA

## 2025-02-21 ENCOUNTER — OFFICE VISIT (OUTPATIENT)
Dept: FAMILY MEDICINE | Facility: CLINIC | Age: 35
End: 2025-02-21
Payer: COMMERCIAL

## 2025-02-21 VITALS
SYSTOLIC BLOOD PRESSURE: 122 MMHG | WEIGHT: 170 LBS | BODY MASS INDEX: 25.18 KG/M2 | RESPIRATION RATE: 20 BRPM | TEMPERATURE: 97.6 F | HEIGHT: 69 IN | HEART RATE: 89 BPM | OXYGEN SATURATION: 97 % | DIASTOLIC BLOOD PRESSURE: 72 MMHG

## 2025-02-21 DIAGNOSIS — B35.2 TINEA MANUUM: ICD-10-CM

## 2025-02-21 DIAGNOSIS — L03.012 PARONYCHIA OF FINGERS OF BOTH HANDS: Primary | ICD-10-CM

## 2025-02-21 DIAGNOSIS — L03.011 PARONYCHIA OF FINGERS OF BOTH HANDS: Primary | ICD-10-CM

## 2025-02-21 RX ORDER — IBUPROFEN 400 MG/1
400 TABLET, FILM COATED ORAL EVERY 6 HOURS PRN
Qty: 60 TABLET | Refills: 0 | Status: SHIPPED | OUTPATIENT
Start: 2025-02-21

## 2025-02-21 RX ORDER — MICONAZOLE NITRATE 20 MG/G
CREAM TOPICAL 2 TIMES DAILY
Qty: 56 G | Refills: 1 | Status: SHIPPED | OUTPATIENT
Start: 2025-02-21

## 2025-02-21 NOTE — PROGRESS NOTES
Assessment & Plan     Paronychia of fingers of both hands  Tinea manuum  Presented with similar issue at ED on 1/25 several days after getting acrylic nails. Treated with 7d Augmentin which made resolve completely. Got nails off and recently had replaced at different salon. Noted same issue of pruritic rash which made him pick at nail beds. Then developed finger swelling and pain, unable to get rings off, now with purulent discharge draining from several nailbeds. No systemic symptoms. Vitals reassuring. Based on history, wonder about contact dermatitis from nails complicated by infection with picking/scratching around area. Rash has improved with OTC anti-fungals. Counseled patient to continue this and to do another course of antibiotics. Attempted to get one of his rings off using ribbon but was unsuccessful. Will have patient follow up early next week for reassessment of infection and swelling. Counseled him to present at ED if swelling continues to worsen around rings, may need them cut off.   - amoxicillin-clavulanate (AUGMENTIN) 875-125 MG tablet; Take 1 tablet by mouth 2 times daily.  - ibuprofen (ADVIL/MOTRIN) 400 MG tablet; Take 1 tablet (400 mg) by mouth every 6 hours as needed for moderate pain.  - miconazole (MICATIN) 2 % external cream; Apply topically 2 times daily. until rash resolved.      The longitudinal plan of care for the diagnosis(es)/condition(s) as documented were addressed during this visit. Due to the added complexity in care, I will continue to support Luis Fernando in the subsequent management and with ongoing continuity of care.    Return in about 4 days (around 2/25/2025) for Follow up.    Subjective   Luis Fernando is a 35 year old, presenting for the following health issues:  infection  (On both hands. )        2/21/2025     2:17 PM   Additional Questions   Roomed by Paw   Accompanied by N/A         2/21/2025    Information    services provided? No     HPI   Infection on  "both hands.   Was initially seen in ED 1/25 for same issue. Started a few days after getting acrylic nails.  Completed 7d Augmentin which it responded to well, reports completely resolved.   Had nails removed and recently got them again at different salon.   Notes that he had pruritic rash which made him pick around his nail beds.  Then swelling and pain occurred now with pus draining from some fingers.   Has been unable to get rings off.  Has been applying OTC topical anti-fungal over past several days which seems to be helping.    Review of Systems  Constitutional, HEENT, cardiovascular, pulmonary, gi and gu systems are negative, except as otherwise noted.      Objective    /72   Pulse 89   Temp 97.6  F (36.4  C) (Oral)   Resp 20   Ht 1.753 m (5' 9\")   Wt 77.1 kg (170 lb)   SpO2 97%   BMI 25.10 kg/m    Body mass index is 25.1 kg/m .  Physical Exam   GENERAL: alert and no distress  RESP: no increased work of breathing  CV: appears well-perfused  SKIN: erythematous rash with scaling and sloughing across fingers. Diffusely swollen, unable to remove rings. Purulent fluid draining from corners of several nailbeds.   NEURO: no focal deficits  PSYCH: mentation appears normal, affect normal/bright        Signed Electronically by: Tremayne Ramirez MD    "

## 2025-02-21 NOTE — PATIENT INSTRUCTIONS
START Augmentin twice daily for 7 day course.    CONTINUE topical anti-fungal cream twice daily until rash resolves.     Treating the underlying issue will help with the swelling. You can also take ibuprofen as needed to help with the inflammation and pain.

## 2025-02-21 NOTE — PROGRESS NOTES
Preceptor Attestation:   Patient seen, evaluated and discussed with the resident Dr. Tremayne Ramirez. I have verified the content of the note, which accurately reflects my assessment of the patient and the plan of care.    Attempted to remove rings but unable to tolerate procedure.     Supervising Physician:  Benjamin Rosenstein, MD, MA  [facility]  [facility]

## 2025-03-31 ENCOUNTER — MYC REFILL (OUTPATIENT)
Dept: FAMILY MEDICINE | Facility: CLINIC | Age: 35
End: 2025-03-31
Payer: COMMERCIAL

## 2025-03-31 ENCOUNTER — TELEPHONE (OUTPATIENT)
Dept: FAMILY MEDICINE | Facility: CLINIC | Age: 35
End: 2025-03-31
Payer: COMMERCIAL

## 2025-03-31 DIAGNOSIS — F41.1 GAD (GENERALIZED ANXIETY DISORDER): ICD-10-CM

## 2025-03-31 DIAGNOSIS — F32.1 CURRENT MODERATE EPISODE OF MAJOR DEPRESSIVE DISORDER WITHOUT PRIOR EPISODE (H): ICD-10-CM

## 2025-03-31 DIAGNOSIS — J30.1 SEASONAL ALLERGIC RHINITIS DUE TO POLLEN: ICD-10-CM

## 2025-03-31 RX ORDER — HYDROXYZINE PAMOATE 25 MG/1
CAPSULE ORAL
Qty: 90 CAPSULE | Refills: 1 | Status: SHIPPED | OUTPATIENT
Start: 2025-03-31

## 2025-03-31 ASSESSMENT — ANXIETY QUESTIONNAIRES
GAD7 TOTAL SCORE: 8
4. TROUBLE RELAXING: SEVERAL DAYS
5. BEING SO RESTLESS THAT IT IS HARD TO SIT STILL: SEVERAL DAYS
6. BECOMING EASILY ANNOYED OR IRRITABLE: NOT AT ALL
3. WORRYING TOO MUCH ABOUT DIFFERENT THINGS: MORE THAN HALF THE DAYS
7. FEELING AFRAID AS IF SOMETHING AWFUL MIGHT HAPPEN: MORE THAN HALF THE DAYS
1. FEELING NERVOUS, ANXIOUS, OR ON EDGE: NOT AT ALL
8. IF YOU CHECKED OFF ANY PROBLEMS, HOW DIFFICULT HAVE THESE MADE IT FOR YOU TO DO YOUR WORK, TAKE CARE OF THINGS AT HOME, OR GET ALONG WITH OTHER PEOPLE?: SOMEWHAT DIFFICULT
IF YOU CHECKED OFF ANY PROBLEMS ON THIS QUESTIONNAIRE, HOW DIFFICULT HAVE THESE PROBLEMS MADE IT FOR YOU TO DO YOUR WORK, TAKE CARE OF THINGS AT HOME, OR GET ALONG WITH OTHER PEOPLE: SOMEWHAT DIFFICULT
2. NOT BEING ABLE TO STOP OR CONTROL WORRYING: MORE THAN HALF THE DAYS
7. FEELING AFRAID AS IF SOMETHING AWFUL MIGHT HAPPEN: MORE THAN HALF THE DAYS

## 2025-03-31 NOTE — TELEPHONE ENCOUNTER
No associated dx for prazosin, outside RN standing orders. Routing to PCP for review and fill. STACIE-7 sent to patient, overdue for recheck. Onel YA

## 2025-03-31 NOTE — TELEPHONE ENCOUNTER
[facility] phone call message- medication clarification/question:    Full Medication Name:     sertraline (ZOLOFT) 100 MG tablet     buPROPion (WELLBUTRIN XL) 150 MG 24 hr tablet       prazosin (MINIPRESS) 2 MG capsule     Question: patient came into clinic asking for refill on above medications -  the Prazosin was patient reported so did advise pt may not fill - [facility]      Pharmacy confirmed as    [id number] - [place], [place] - [address] [place] [address] AT SEC OF [facility]  : Yes    OK to leave a message on voice mail? Yes    Primary language: English      needed? No    Call taken on March 31, 2025 at 1:46 PM by Alecia West

## 2025-03-31 NOTE — TELEPHONE ENCOUNTER
Medication is active on med list. Sig matches medication list sig. Filling per RN standing orders. Onel YA

## 2025-04-01 RX ORDER — PRAZOSIN HYDROCHLORIDE 2 MG/1
2 CAPSULE ORAL AT BEDTIME
OUTPATIENT
Start: 2025-04-01

## 2025-04-01 RX ORDER — SERTRALINE HYDROCHLORIDE 100 MG/1
100 TABLET, FILM COATED ORAL DAILY
Qty: 90 TABLET | Refills: 3 | Status: SHIPPED | OUTPATIENT
Start: 2025-04-01

## 2025-04-01 RX ORDER — HYDROXYZINE HYDROCHLORIDE 25 MG/1
25 TABLET, FILM COATED ORAL EVERY 8 HOURS PRN
OUTPATIENT
Start: 2025-04-01

## 2025-04-02 NOTE — TELEPHONE ENCOUNTER
Only Sertraline filled by Dr Ramirez -- other medications were reported by patient -- will need to go to those providers for refills

## 2025-05-17 ENCOUNTER — HEALTH MAINTENANCE LETTER (OUTPATIENT)
Age: 35
End: 2025-05-17

## 2025-05-19 ENCOUNTER — MYC REFILL (OUTPATIENT)
Dept: FAMILY MEDICINE | Facility: CLINIC | Age: 35
End: 2025-05-19

## 2025-05-19 ENCOUNTER — OFFICE VISIT (OUTPATIENT)
Dept: FAMILY MEDICINE | Facility: CLINIC | Age: 35
End: 2025-05-19

## 2025-05-19 VITALS
HEART RATE: 97 BPM | OXYGEN SATURATION: 97 % | WEIGHT: 149 LBS | DIASTOLIC BLOOD PRESSURE: 77 MMHG | HEIGHT: 71 IN | TEMPERATURE: 98.2 F | BODY MASS INDEX: 20.86 KG/M2 | SYSTOLIC BLOOD PRESSURE: 123 MMHG | RESPIRATION RATE: 20 BRPM

## 2025-05-19 DIAGNOSIS — J30.1 SEASONAL ALLERGIC RHINITIS DUE TO POLLEN: ICD-10-CM

## 2025-05-19 DIAGNOSIS — Z11.3 SCREENING EXAMINATION FOR VENEREAL DISEASE: ICD-10-CM

## 2025-05-19 DIAGNOSIS — K21.9 GASTROESOPHAGEAL REFLUX DISEASE WITHOUT ESOPHAGITIS: Primary | ICD-10-CM

## 2025-05-19 DIAGNOSIS — Z59.71 INSURANCE COVERAGE PROBLEMS: ICD-10-CM

## 2025-05-19 DIAGNOSIS — M25.531 RIGHT WRIST PAIN: ICD-10-CM

## 2025-05-19 DIAGNOSIS — J45.990 EXERCISE-INDUCED ASTHMA: ICD-10-CM

## 2025-05-19 DIAGNOSIS — N50.82 SCROTAL PAIN: Primary | ICD-10-CM

## 2025-05-19 LAB
ALBUMIN UR-MCNC: 100 MG/DL
APPEARANCE UR: CLEAR
BACTERIA #/AREA URNS HPF: NORMAL /HPF
BILIRUB UR QL STRIP: NEGATIVE
COLOR UR AUTO: YELLOW
GLUCOSE UR STRIP-MCNC: NEGATIVE MG/DL
HGB UR QL STRIP: NEGATIVE
HIV 1+2 AB+HIV1 P24 AG SERPL QL IA: REACTIVE
KETONES UR STRIP-MCNC: NEGATIVE MG/DL
LEUKOCYTE ESTERASE UR QL STRIP: NEGATIVE
NITRATE UR QL: NEGATIVE
PH UR STRIP: 7 [PH] (ref 5–7)
RBC #/AREA URNS AUTO: NORMAL /HPF
SP GR UR STRIP: 1.02 (ref 1–1.03)
T PALLIDUM AB SER QL: REACTIVE
UROBILINOGEN UR STRIP-ACNC: 1 E.U./DL
WBC #/AREA URNS AUTO: NORMAL /HPF

## 2025-05-19 PROCEDURE — 36415 COLL VENOUS BLD VENIPUNCTURE: CPT

## 2025-05-19 PROCEDURE — 86593 SYPHILIS TEST NON-TREP QUANT: CPT

## 2025-05-19 PROCEDURE — 86701 HIV-1ANTIBODY: CPT

## 2025-05-19 PROCEDURE — 87591 N.GONORRHOEAE DNA AMP PROB: CPT

## 2025-05-19 PROCEDURE — 86702 HIV-2 ANTIBODY: CPT

## 2025-05-19 PROCEDURE — 86592 SYPHILIS TEST NON-TREP QUAL: CPT

## 2025-05-19 PROCEDURE — 86780 TREPONEMA PALLIDUM: CPT

## 2025-05-19 PROCEDURE — 87491 CHLMYD TRACH DNA AMP PROBE: CPT

## 2025-05-19 PROCEDURE — 87389 HIV-1 AG W/HIV-1&-2 AB AG IA: CPT

## 2025-05-19 PROCEDURE — 81001 URINALYSIS AUTO W/SCOPE: CPT

## 2025-05-19 RX ORDER — IBUPROFEN 600 MG/1
600 TABLET, FILM COATED ORAL EVERY 6 HOURS PRN
Qty: 60 TABLET | Refills: 0 | Status: SHIPPED | OUTPATIENT
Start: 2025-05-19

## 2025-05-19 RX ORDER — FLUTICASONE PROPIONATE 50 MCG
2 SPRAY, SUSPENSION (ML) NASAL DAILY
Qty: 16 G | Refills: 2 | Status: SHIPPED | OUTPATIENT
Start: 2025-05-19

## 2025-05-19 RX ORDER — LORATADINE 10 MG/1
10 TABLET ORAL DAILY
Qty: 90 TABLET | Refills: 3 | Status: SHIPPED | OUTPATIENT
Start: 2025-05-19

## 2025-05-19 NOTE — PROGRESS NOTES
Preceptor Attestation:   Patient seen, evaluated and discussed with the resident. I have verified the content of the note, which accurately reflects my assessment of the patient and the plan of care.    Supervising Physician:Laura Moreira MD    [facility]

## 2025-05-19 NOTE — PATIENT INSTRUCTIONS
We will reach out with your lab and imaging results.   Wear supportive underwear, continue icing, and take ibuprofen as needed for your pain.       Safer Sex: Care Instructions  Overview  Safer sex is a way to reduce your risk of getting a sexually transmitted infection (STI). It can also help prevent pregnancy.  Several products can help you practice safer sex and reduce your chance of STIs. One of the best is a condom. There are internal and external condoms. You can use a special rubber sheet (dental dam) for protection during oral sex. Disposable gloves can keep your hands from touching blood, semen, or other body fluids that can carry infections.  Remember that birth control methods such as diaphragms, IUDs, foams, and birth control pills do not stop you from getting STIs.  Follow-up care is a key part of your treatment and safety. Be sure to make and go to all appointments, and call your doctor if you are having problems. It's also a good idea to know your test results and keep a list of the medicines you take.  How can you care for yourself at home?  Think about getting vaccinated to help prevent hepatitis A, hepatitis B, and human papillomavirus (HPV). They can be spread through sex.  Use a condom every time you have sex. Use an external condom, which goes on the penis. Or use an internal condom, which goes into the vagina or anus.  Make sure you use the right size external condom. A condom that's too small can break easily. A condom that's too big can slip off during sex.  Use a new condom each time you have sex. Be careful not to poke a hole in the condom when you open the wrapper.  Don't use an internal condom and an external condom at the same time.  Never use petroleum jelly (such as Vaseline), grease, hand lotion, baby oil, or anything with oil in it. These products can make holes in the condom.  After intercourse, hold the edge of the condom as you remove it. This will help keep semen from spilling  "out of the condom.  Do not have sex with anyone who has symptoms of an STI, such as sores on the genitals or mouth.  Do not drink a lot of alcohol or use drugs before sex.  Limit your sex partners. Sex with one partner who has sex only with you can reduce your risk of getting an STI.  Don't share sex toys. But if you do share them, use a condom and clean the sex toys between each use.  Talk to any partners before you have sex. Talk about what you feel comfortable with and whether you have any boundaries with sex. And find out if your partner or partners may be at risk for any STI. Keep in mind that a person may be able to spread an STI even if they do not have symptoms. You and any partners may want to get tested for STIs.  Where can you learn more?  Go to https://www.[employer].net/patiented  Enter B608 in the search box to learn more about \"Safer Sex: Care Instructions.\"  Current as of: April 30, 2024  Content Version: 14.4    8404-9907 [employer].   Care instructions adapted under license by your healthcare professional. If you have questions about a medical condition or this instruction, always ask your healthcare professional. [employer] disclaims any warranty or liability for your use of this information.    "

## 2025-05-19 NOTE — PROGRESS NOTES
"  Assessment & Plan     Scrotal pain  Screening examination for venereal disease  Onset three days ago. Posterior side of L testicle. Pain comes and goes, rates 0-8/10. Has noticed some bulging but no worsening of pain with Valsalva. Has iced over past 24 hours, seems to help when keeping scrotum elevated. Has had one new male sexual partner in past several months, affirms receptive anal intercourse with barrier contraception. No penile discharge, urinary symptoms, pelvic or rectal pain, rashes/lesions/skin discoloration. Physical exam notable for \"bag of worms\" bulging upon palpation of scrotum; otherwise  exam largely benign. Suspect epididymitis vs varicocele. Lower suspicion for torsion given pain severity/character. Will get urine studies, full STI testing and scrotal ultrasound. Counseled patient on supportive cares for now with tighter-fitting underwear, icing and NSAID use.   - First voided urine-Chlamydia trachomatis/Neisseria gonorrhoeae by PCR; Future  - Throat-Chlamydia trachomatis/Neisseria gonorrhoeae by PCR; Future  - Rectum-Chlamydia trachomatis/Neisseria gonorrhoeae by PCR; Future  - UA Macroscopic with reflex to Microscopic and Culture; Future  - ibuprofen (ADVIL/MOTRIN) 600 MG tablet; Take 1 tablet (600 mg) by mouth every 6 hours as needed for moderate pain.  - US Testicular & Scrotum w Doppler Ltd; Future  - First voided urine-Chlamydia trachomatis/Neisseria gonorrhoeae by PCR  - Throat-Chlamydia trachomatis/Neisseria gonorrhoeae by PCR  - Rectum-Chlamydia trachomatis/Neisseria gonorrhoeae by PCR  - UA Macroscopic with reflex to Microscopic and Culture  - UA Microscopic with Reflex to Culture  - Urine Culture; Future  - Urine Culture  - HIV Antigen Antibody Combo Cascade; Future  - Treponema Abs w Reflex to RPR and Titer; Future  - HIV Antigen Antibody Combo Cascade  - Treponema Abs w Reflex to RPR and Titer    Seasonal allergic rhinitis due to pollen  Refill requested.  - fluticasone (FLONASE) " "50 MCG/ACT nasal spray; Spray 2 sprays into both nostrils daily.    Insurance coverage [place]  Does not currently have insurance. Requesting help with prorate.   - Primary Care - Care Coordination Referral; Future      The longitudinal plan of care for the diagnosis(es)/condition(s) as documented were addressed during this visit. Due to the added complexity in care, I will continue to support Luis Fernando in the subsequent management and with ongoing continuity of care.    Follow-up  Return if symptoms worsen or fail to improve.      Subjective   Luis Fernando is a 35 year old, presenting for the following health issues:  painful in left testicles  and Refill Request        5/19/2025     9:12 AM   Additional Questions   Roomed by bianca   Accompanied by self         5/19/2025    Information    services provided? No     HPI    L testicle pain. Pain comes and goes. Rates 0 to 8 out of 10.   Going on for 3 days. Has iced it for one full day.   No penile discharge.   Seems like bulging maybe in scrotum.   No change in pain with valsalva.   Stays localized. No rectal or pelvic pain otherwise.   No urinary symptoms.   One new sexual partner in past 6 months.  Identity as male. Patient affirms receptive anal intercourse with barrier contraception.   Hasn't taken anything.       Review of Systems  Constitutional, HEENT, cardiovascular, pulmonary, gi and gu systems are negative, except as otherwise noted.      Objective    /77   Pulse 97   Temp 98.2  F (36.8  C) (Oral)   Resp 20   Ht 1.8 m (5' 10.87\")   Wt 67.6 kg (149 lb)   SpO2 97%   BMI 20.86 kg/m    Body mass index is 20.86 kg/m .  Physical Exam   GENERAL: alert and no distress  EYES: Eyes grossly normal to inspection  RESP: no increased work of breathing  CV: appears well-perfused   (male): testicles normal without atrophy or masses, no hernias, and penis normal without urethral discharge. \"Bag of worms\" bulging around L testicle.  SKIN: no " suspicious lesions or rashes on limited exam  NEURO: no focal deficits  PSYCH: mentation appears normal, affect normal/bright        Signed Electronically by: Tremayne Ramirez MD

## 2025-05-20 LAB
C TRACH DNA SPEC QL PROBE+SIG AMP: NEGATIVE
HIV 1+2 AB+HIV1P24 AG SERPLBLD IA.RAPID: ABNORMAL
HIV 2 AB SERPLBLD QL IA.RAPID: NONREACTIVE
HIV1 AB SERPLBLD QL IA.RAPID: REACTIVE
N GONORRHOEA DNA SPEC QL NAA+PROBE: NEGATIVE
RPR SER QL: REACTIVE
RPR SER-TITR: NORMAL {TITER}
SPECIMEN TYPE: NORMAL

## 2025-05-20 PROCEDURE — 87086 URINE CULTURE/COLONY COUNT: CPT

## 2025-05-20 RX ORDER — OXYBUTYNIN CHLORIDE 5 MG/1
5 TABLET ORAL 2 TIMES DAILY PRN
OUTPATIENT
Start: 2025-05-20

## 2025-05-20 RX ORDER — ACETAMINOPHEN 500 MG
500-1000 TABLET ORAL EVERY 6 HOURS PRN
Qty: 200 TABLET | Refills: 1 | Status: SHIPPED | OUTPATIENT
Start: 2025-05-20

## 2025-05-20 RX ORDER — PRAZOSIN HYDROCHLORIDE 2 MG/1
2 CAPSULE ORAL AT BEDTIME
OUTPATIENT
Start: 2025-05-20

## 2025-05-20 RX ORDER — HYDROXYZINE HYDROCHLORIDE 25 MG/1
25 TABLET, FILM COATED ORAL EVERY 8 HOURS PRN
Refills: 0 | OUTPATIENT
Start: 2025-05-20

## 2025-05-20 RX ORDER — BUPROPION HYDROCHLORIDE 150 MG/1
150 TABLET ORAL EVERY MORNING
OUTPATIENT
Start: 2025-05-20

## 2025-05-20 RX ORDER — ALBUTEROL SULFATE 90 UG/1
2 INHALANT RESPIRATORY (INHALATION) EVERY 4 HOURS PRN
Qty: 18 G | Refills: 3 | Status: SHIPPED | OUTPATIENT
Start: 2025-05-20

## 2025-05-20 RX ORDER — NAPROXEN 500 MG/1
500 TABLET ORAL 2 TIMES DAILY WITH MEALS
Qty: 14 TABLET | Refills: 0 | OUTPATIENT
Start: 2025-05-20

## 2025-05-20 RX ORDER — OMEPRAZOLE 20 MG/1
20 CAPSULE, DELAYED RELEASE ORAL DAILY
Qty: 90 CAPSULE | Refills: 0 | Status: SHIPPED | OUTPATIENT
Start: 2025-05-20

## 2025-05-21 ENCOUNTER — RESULTS FOLLOW-UP (OUTPATIENT)
Dept: FAMILY MEDICINE | Facility: CLINIC | Age: 35
End: 2025-05-21

## 2025-05-21 ENCOUNTER — TELEPHONE (OUTPATIENT)
Dept: INFECTIOUS DISEASES | Facility: CLINIC | Age: 35
End: 2025-05-21

## 2025-05-21 DIAGNOSIS — Z21 ASYMPTOMATIC HUMAN IMMUNODEFICIENCY VIRUS (HIV) INFECTION STATUS (H): Primary | ICD-10-CM

## 2025-05-21 DIAGNOSIS — Z21 HIV-1 INFECTION (H): Primary | ICD-10-CM

## 2025-05-21 LAB — BACTERIA UR CULT: NORMAL

## 2025-05-21 NOTE — PROGRESS NOTES
Reviewed labs from 5/19 visit.   UA non-infectious. GC/CT swabs x3 negative.   Syphilis testing positive (but did not realize prior to ordering he had history of this with treatment). Titers reassuring.   Incidentally, HIV-1 positive. Looks like he was negative when last checked in 2019.   Called patient with results and next steps.   Urgent ID referral placed for further management.       Tremayne Ramirez MD

## 2025-05-21 NOTE — TELEPHONE ENCOUNTER
EP called 5/21 to sched a lab and New B20 appt with any B20 ID provider, urgent referral from Dr. Tremayne Ramirez MD.

## 2025-06-12 ENCOUNTER — PATIENT OUTREACH (OUTPATIENT)
Dept: INFECTIOUS DISEASES | Facility: CLINIC | Age: 35
End: 2025-06-12
Payer: MEDICAID

## 2025-06-12 NOTE — TELEPHONE ENCOUNTER
Social Work ID Assessment  6/12/2025      Living Situation    Address: [address]   [place] [address]     Living Situation: Stable         Communication    Telephone (2 zrcorof preferably): [phone]     Ashok account: has account with recent log in        Transportation    Transportation Mode: Patient has access to vehicle      Transportation Resources available: insurance status unclear, potentially has PMAP insurance benefits with an active plan        Family/Support Network    Social Supports: Did not discuss     Professional Supports/Services: Has PCP with [facility] Dr. Ramirez    /Care Coordinator, if applicable: N/A    Relationship Status: Spouse          Insurance/Financial/Legal Info    Insurance: Patient reports he thinks he has MA, but is not showing active in [place]-its. Patient reports he may be missing some paperwork, but not sure what paperwork since he is on a roadtrip. Patient reports he will look at his paperwork and try to figure out his insurance prior to his appointment next week.     Financial Concerns: Patient is unemployed since last year.     How does the patient describe their current finances? Did not discuss at length-focused on insurance.     Employment: Unemployed     Advanced Care Planning: No documents in chart     Decision Making: Self          Mental Health/Substance Use     Mental Health: Did not discuss at this time    Records indicate depression and anxiety in Diagnosis Field.     Substance Use: Did not discuss at this time.    Records indicate Cocaine Use Disorder, Methamphetamine Use Disorder, Tobacco Use Disorder in Diagnosis Field.          Patient Identified Strengths:     Did not assess, focused on insurance and resource needs at this time.     PCP has referred Patient for care coordination services through their clinic team.     Patient Identified Service/Resource needs:      Patient denied any resources needs at this time.  Patient will work on figuring out his insurance status. Briefly spoke about Program HH resource if needed.     Patient Goals:     Patient has an appointment to establish care at ID clinic on 6/17 at 2 pm with labs prior. Patient plans to figure out insurance status prior to appointment.     Reviewed appointment details including time, location, and area. Will send MyChart as requested.         MACHO Haile, [facility]  Infectious Disease

## 2025-06-17 ENCOUNTER — OFFICE VISIT (OUTPATIENT)
Dept: INFECTIOUS DISEASES | Facility: CLINIC | Age: 35
End: 2025-06-17
Attending: INTERNAL MEDICINE
Payer: MEDICAID

## 2025-06-17 ENCOUNTER — LAB (OUTPATIENT)
Dept: LAB | Facility: CLINIC | Age: 35
End: 2025-06-17
Payer: MEDICAID

## 2025-06-17 VITALS
OXYGEN SATURATION: 98 % | SYSTOLIC BLOOD PRESSURE: 135 MMHG | WEIGHT: 149 LBS | BODY MASS INDEX: 20.86 KG/M2 | HEART RATE: 98 BPM | DIASTOLIC BLOOD PRESSURE: 83 MMHG

## 2025-06-17 DIAGNOSIS — Z21 HIV-1 INFECTION (H): ICD-10-CM

## 2025-06-17 DIAGNOSIS — N20.0 NEPHROLITHIASIS: ICD-10-CM

## 2025-06-17 LAB
ALBUMIN SERPL BCG-MCNC: 4.6 G/DL (ref 3.5–5.2)
ALP SERPL-CCNC: 105 U/L (ref 40–150)
ALT SERPL W P-5'-P-CCNC: 12 U/L (ref 0–70)
ANION GAP SERPL CALCULATED.3IONS-SCNC: 12 MMOL/L (ref 7–15)
AST SERPL W P-5'-P-CCNC: 16 U/L (ref 0–45)
BILIRUB SERPL-MCNC: 0.4 MG/DL
BUN SERPL-MCNC: 11.5 MG/DL (ref 6–20)
CALCIUM SERPL-MCNC: 9.7 MG/DL (ref 8.8–10.4)
CHLORIDE SERPL-SCNC: 103 MMOL/L (ref 98–107)
CHOLEST SERPL-MCNC: 202 MG/DL
CREAT SERPL-MCNC: 0.89 MG/DL (ref 0.67–1.17)
EGFRCR SERPLBLD CKD-EPI 2021: >90 ML/MIN/1.73M2
ERYTHROCYTE [DISTWIDTH] IN BLOOD BY AUTOMATED COUNT: 13.7 % (ref 10–15)
FASTING STATUS PATIENT QL REPORTED: NO
FASTING STATUS PATIENT QL REPORTED: NO
GLUCOSE SERPL-MCNC: 110 MG/DL (ref 70–99)
HBV CORE AB SERPL QL IA: NONREACTIVE
HBV SURFACE AB SERPL IA-ACNC: 5.9 M[IU]/ML
HBV SURFACE AB SERPL IA-ACNC: NONREACTIVE M[IU]/ML
HBV SURFACE AG SERPL QL IA: NONREACTIVE
HCO3 SERPL-SCNC: 23 MMOL/L (ref 22–29)
HCT VFR BLD AUTO: 45.9 % (ref 40–53)
HCV AB SERPL QL IA: NONREACTIVE
HDLC SERPL-MCNC: 34 MG/DL
HGB BLD-MCNC: 15.3 G/DL (ref 13.3–17.7)
LDLC SERPL CALC-MCNC: 119 MG/DL
MCH RBC QN AUTO: 27.2 PG (ref 26.5–33)
MCHC RBC AUTO-ENTMCNC: 33.3 G/DL (ref 31.5–36.5)
MCV RBC AUTO: 82 FL (ref 78–100)
MEV IGG SER IA-ACNC: 110 AU/ML
MEV IGG SER IA-ACNC: POSITIVE
MUMPS ANTIBODY IGG INSTRUMENT VALUE: 103 AU/ML
MUV IGG SER QL IA: POSITIVE
NONHDLC SERPL-MCNC: 168 MG/DL
PLATELET # BLD AUTO: 255 10E3/UL (ref 150–450)
POTASSIUM SERPL-SCNC: 3.6 MMOL/L (ref 3.4–5.3)
PROT SERPL-MCNC: 7.7 G/DL (ref 6.4–8.3)
RBC # BLD AUTO: 5.62 10E6/UL (ref 4.4–5.9)
SODIUM SERPL-SCNC: 138 MMOL/L (ref 135–145)
TRIGL SERPL-MCNC: 243 MG/DL
WBC # BLD AUTO: 5.9 10E3/UL (ref 4–11)

## 2025-06-17 PROCEDURE — 86765 RUBEOLA ANTIBODY: CPT | Performed by: INTERNAL MEDICINE

## 2025-06-17 PROCEDURE — 87340 HEPATITIS B SURFACE AG IA: CPT | Performed by: INTERNAL MEDICINE

## 2025-06-17 PROCEDURE — 1126F AMNT PAIN NOTED NONE PRSNT: CPT | Performed by: INTERNAL MEDICINE

## 2025-06-17 PROCEDURE — 87491 CHLMYD TRACH DNA AMP PROBE: CPT | Performed by: INTERNAL MEDICINE

## 2025-06-17 PROCEDURE — 3079F DIAST BP 80-89 MM HG: CPT | Performed by: INTERNAL MEDICINE

## 2025-06-17 PROCEDURE — 86706 HEP B SURFACE ANTIBODY: CPT | Performed by: INTERNAL MEDICINE

## 2025-06-17 PROCEDURE — 87903 PHENOTYPE DNA HIV W/CULTURE: CPT | Mod: 90 | Performed by: PATHOLOGY

## 2025-06-17 PROCEDURE — 86803 HEPATITIS C AB TEST: CPT | Performed by: INTERNAL MEDICINE

## 2025-06-17 PROCEDURE — 86735 MUMPS ANTIBODY: CPT | Performed by: INTERNAL MEDICINE

## 2025-06-17 PROCEDURE — 86359 T CELLS TOTAL COUNT: CPT | Performed by: INTERNAL MEDICINE

## 2025-06-17 PROCEDURE — 87901 NFCT AGT GNTYP ALYS HIV1 REV: CPT | Mod: 90 | Performed by: PATHOLOGY

## 2025-06-17 PROCEDURE — 80053 COMPREHEN METABOLIC PANEL: CPT | Performed by: PATHOLOGY

## 2025-06-17 PROCEDURE — 86762 RUBELLA ANTIBODY: CPT | Performed by: INTERNAL MEDICINE

## 2025-06-17 PROCEDURE — 86644 CMV ANTIBODY: CPT | Performed by: INTERNAL MEDICINE

## 2025-06-17 PROCEDURE — 99205 OFFICE O/P NEW HI 60 MIN: CPT | Performed by: INTERNAL MEDICINE

## 2025-06-17 PROCEDURE — 86481 TB AG RESPONSE T-CELL SUSP: CPT | Performed by: INTERNAL MEDICINE

## 2025-06-17 PROCEDURE — 86704 HEP B CORE ANTIBODY TOTAL: CPT | Performed by: INTERNAL MEDICINE

## 2025-06-17 PROCEDURE — 86777 TOXOPLASMA ANTIBODY: CPT | Performed by: INTERNAL MEDICINE

## 2025-06-17 PROCEDURE — 82955 ASSAY OF G6PD ENZYME: CPT | Mod: 90 | Performed by: PATHOLOGY

## 2025-06-17 PROCEDURE — 3075F SYST BP GE 130 - 139MM HG: CPT | Performed by: INTERNAL MEDICINE

## 2025-06-17 PROCEDURE — 85027 COMPLETE CBC AUTOMATED: CPT | Performed by: PATHOLOGY

## 2025-06-17 PROCEDURE — 99000 SPECIMEN HANDLING OFFICE-LAB: CPT | Performed by: PATHOLOGY

## 2025-06-17 PROCEDURE — 86592 SYPHILIS TEST NON-TREP QUAL: CPT | Performed by: INTERNAL MEDICINE

## 2025-06-17 PROCEDURE — 3049F LDL-C 100-129 MG/DL: CPT | Performed by: INTERNAL MEDICINE

## 2025-06-17 PROCEDURE — 36415 COLL VENOUS BLD VENIPUNCTURE: CPT | Performed by: PATHOLOGY

## 2025-06-17 PROCEDURE — G0463 HOSPITAL OUTPT CLINIC VISIT: HCPCS | Performed by: INTERNAL MEDICINE

## 2025-06-17 PROCEDURE — 86593 SYPHILIS TEST NON-TREP QUANT: CPT | Performed by: INTERNAL MEDICINE

## 2025-06-17 PROCEDURE — 87904 PHENOTYPE DNA HIV W/CLT ADD: CPT | Mod: 90 | Performed by: PATHOLOGY

## 2025-06-17 PROCEDURE — 87591 N.GONORRHOEAE DNA AMP PROB: CPT | Performed by: INTERNAL MEDICINE

## 2025-06-17 PROCEDURE — 80061 LIPID PANEL: CPT | Performed by: PATHOLOGY

## 2025-06-17 PROCEDURE — 87536 HIV-1 QUANT&REVRSE TRNSCRPJ: CPT | Performed by: INTERNAL MEDICINE

## 2025-06-17 ASSESSMENT — PAIN SCALES - GENERAL: PAINLEVEL_OUTOF10: NO PAIN (0)

## 2025-06-17 NOTE — PROGRESS NOTES
[facility] INFECTIOUS DISEASE CLINIC [address] [address]  Phone: [phone]  Fax: [phone]    Patient:  Luis Fernando Martinez, Date of birth 1990  Date of Visit:  06/17/2025  Referring Provider Tremayne Ramirez  {Do not delete. Used for tracking note template use:657391}      Luis Fernando Martinez is here today for a complaint of HIV      Assessment & Plan/Recommendations     ID problem list:  ***    Recs:  1.  HIV infection, recent diagnosis  - send viral load and CD4 count today  - will also check: phenosense, CMV ab, toxo ab, lipid profile, UA, Hep B/C screenings, quantiferon, routine STI screenings, and MMR antibodies      2. Health maintenance  - follow up with primary care (Dr. Tremayne Ramirez at [facility]) or establish with new primary care provider for remaining health issues    Colonoscopy: never  GC/Chlamydia: ***  Syphilis: ***  Quantiferon: ***  G6PD: ***  HLA-: ***  Crypto (if CD4<50): ***  Toxo IgG: ***   CMV IgG: ***  Hep A antibody : ***  Hep B antibody: ***  Hep C antibody: ***  Hep A&B vaccine: ***  Tdap vaccine: ***  Flu vaccine: ***  Prevnar-13 PCV conj vaccine: ***  Pneumovax-23 PPSV poly vaccine: ***  Meningococcal MenACWY (Menveo, Menactra) vaccine: ***  HPV vaccine: ***  MMR vaccine: ***  Shingrix zoster vaccine: ***  COVID-19 vaccine: ***      I communicated with the patient and his boyfriend at this visit.      Patient was seen on 06/17/2025     *** minutes spent by me on the date of the encounter doing chart review, history and exam, documentation and further activities per the note       Laz Taveras MD  Infectious Diseases      Subjective       HPI:  Luis Fernando Martinez is a 35 year old male with PMH including prior PrEP (previously followed with [facility] Dr. Yang), asthma, GERD, anxiety/depression, active drug use (meth), nephrolithiasis, who was referred to [facility] ID clinic by primary  care provider Dr. Ramirez for recently (5/2025) diagnosed HIV infection.    Per patient, occasional hot flashes, night sweats off/on for last month. No sore throat, some swollen nodes. Has had multiple partners in past. Last screened 2018, last took PrEP 2-3 years ago, stopped few years back (no issues taking it). Last went to drug rehab treatment 2/2025, still using meth (smokes, no injecting drugs), last used today. He also expresses possibility that some people in meth community inject each other in a malicious way when they're passed out, but doesn't think that ever happened to him.     His boyfriend has also not been screened in years but expressed openness to getting screened [facility] now that Luis Fernando was found to be positive. Also discussed with both of them not having unprotected sex until patient is able to get undetectable on ART. Discussed with patient possible side effects of ART, how to take on daily basis and improtance of taking it correctly to avoid resistance. Discussed that I would have ID pharmacist and SW reach out to him to also help with his care. Patient amenable to this and expresses desire to start ART as soon as feasible. He is also agreeable to getting labs drawn today (has lab appointment at 3;30 pm today scheduled).        HIV past medical history:  Diagnosed: 5/2025  Approximate date of transmission: unknown  Risk factors: MSM  Jean CD4: pending  Viral load at time of diagnosis:  Genotypes/mutation history:  Treatment history:  Prior OIs:  unknown  TB screening:  never diagnosed, last screened negative when incarcerated 2015, no known sick contacts     ART Adherence:  Current regimen:  n/a  Missed doses in last week, month:  Estimates adherence at:  Medication side effects:  OTC medications:    Social history:  Background: Originally from [place] [facility]  Lives in/with: with Dad and boyfriend in duplex  Supports: no kids  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  Otherwise as per HPI    PAST SURGICAL HISTORY  No past surgical history on file.  Otherwise as per HPI    ALLERGIES AND DRUG REACTIONS  Allergies   Allergen Reactions    Peanut-Containing Drug Products Anaphylaxis       FAMILY HISTORY  No known immunocompromising conditions or infections unless listed below  family history is not on file.    SOCIAL AND FUNCTIONAL HISTORY  Social History     Tobacco Use    Smoking status: Every Day     Current packs/day: 1.00     Types: Cigarettes   Vaping Use    Vaping status: Every Day     Otherwise as per HPI    CURRENT ANTIMICROBIALS  Other medications reviewed in EPIC  ***      REVIEW OF SYSTEMS  ROS  as above.      Objective   PHYSICAL EXAMINATION     vitals were not taken for this visit.     Constitutional:  appearance not in distress, appears comfortable, cooperative  Eyes: no conjunctival injection, no scleral icterus  ENT: no nasal discharge, membranes moist, oropharynx pink and without exudates or thrush  Cardiovascular: regular rate and rhythm  Pulmonary: unlabored breathing, clear to ascultation bilaterally  Gastrointestinal: abdomen non-distended, soft, non-tender, normoactive bowel sounds  Genitourinary: no suprapubic tenderness, no CVA tenderness  Musculoskeletal: normal bulk and tone  Lymphatic: no significant LAD   Extremities: warm and well perfused, no cyanosis or edema  Skin: no rashes  Neurologic: awake, alert and responsive, moving all extremities, no facial asymmetry, no nuchal rigidity  Psychiatric: normal judgment/insight, normal  memory, normal mood/affect      Other Significant Information (Labs, cultures, radiology, etc)    Recent micro reviewed: ***      Recent radiology reviewed:  ***

## 2025-06-17 NOTE — LETTER
6/17/2025       RE: Luis Fernando Martinez  [address]  [place] [address]     Dear Colleague,    Thank you for referring your patient, Luis Fernando Martinez, to the [facility] INFECTIOUS DISEASE CLINIC [place] at [facility]. Please see a copy of my visit note below.      [facility] INFECTIOUS DISEASE [facility]IN[facility] [place]  [address] [address]  Phone: [phone]  Fax: [phone]    Patient:  Luis Fernando Martinez, Date of birth 1990  Date of Visit:  06/17/2025  Referring Provider Tremayne Ramirez        Luis Fernando Martinez is here today for a complaint of HIV      Assessment & Plan/Recommendations     35 year old male with PMH including prior use of PrEP (previously followed with [facility] Dr. Yang, self-discontinued HIV PrEP 2-3 years prior), previously treated syphilis, asthma, GERD, anxiety/depression, active drug use (methamphetamine), nephrolithiasis, who was referred to [facility] ID clinic by primary care provider Dr. Ramirez for recently (5/2025) diagnosed HIV infection.    ID problem list:  HIV infection, recently diagnosed    Recs:  1.  HIV infection, recent diagnosis  - start Biktarvy (BIC/FTC/TAF) one tab daily - will send to pharmacy  - also requested ID pharmacist and ID social work to help follow up on his insurance and medication issues/counseling in meantime if able  - send viral load and CD4 count today  - will also check: phenosense, CMV ab, toxo ab, lipid profile, UA, Hep B/C screenings, quantiferon, routine STI screenings, and MMR antibodies    - patient should follow up in approx 1 month after starting ART to reassess clinically and for repeat labs drawn prior (ordered)    2. History of syphilis  - treated previously for late latent syphilis at [facility] (2/2019) with IM penicillin x3 with RPR 1:32  - most recently 5/2025 RPR stable at 1:2  - continue to monitor RPR with routine STI  screening as noted above    3. Health maintenance  - he should continue to follow up with primary care (Dr. Tremayne Ramirez at [facility]) for remaining health issues    Colonoscopy: never  GC/Chlamydia:   Syphilis:   Quantiferon:   G6PD:   HLA-:   Crypto (if CD4<50):   Toxo IgG:    CMV IgG:   Hep A antibody :   Hep B antibody:   Hep C antibody:   Hep A vaccine: unknown  Hep B vaccine: 6/1993, 7/1993, 12/1993   Tdap vaccine: 4/2024  Flu vaccine: 1/2025  Prevnar-20 PCV conj vaccine: 4/2024  Pneumovax-23 PPSV poly vaccine: 2011  Meningococcal MenACWY (Menveo, Menactra) vaccine: 2008  HPV vaccine: unknown  MMR vaccine: 1993, 2003  Varicella vaccine: 1997, 2007  COVID-19 vaccine: 6/2021, 1/2025  Jynneos vaccine: unknown    I communicated with the patient and his boyfriend at this visit.      Patient was seen on 06/17/2025 in ID clinic.    60 minutes spent by me on the date of the encounter doing chart review, history and exam, documentation and further activities per the note     Laz Taveras MD  Infectious Diseases      Subjective       HPI:  Luis Fernando Martinez is a 35 year old male with PMH including prior use of PrEP (previously followed with [facility] Dr. Yang, self-discontinued HIV PrEP 2-3 years prior), asthma, GERD, anxiety/depression, prior treated syphilis, active drug use (methamphetamine), nephrolithiasis, who was referred to [facility] ID clinic by primary care provider Dr. Ramirez for recently (5/2025) diagnosed HIV infection.    Per patient, in terms of symptoms he has occasional hot flashes and night sweats off/on for last month. No sore throat, some swollen nodes. Occasional soreness of testicles, comes/goes, not currently. Has had multiple partners in past. Last screened 2018, last took PrEP 2-3 years ago, stopped few years back (no issues taking it, he discontinued it at that time because of the opinions of his prior partner). Last went to drug rehab treatment  2/2025, still using meth (smokes, no injecting drugs), last used today. He also expresses possibility that some people in meth community inject each other in a malicious way when they're passed out, but doesn't think that ever happened to him.     His boyfriend has also not been screened in years but expressed openness to getting screened at the [facility] now that Luis Fernando was found to be positive. Also discussed with both of them not having unprotected sex until patient is able to get on and undetectable on ART. Discussed with patient possible side effects of ART, how to take on daily basis and improtance of taking it correctly to avoid resistance. Discussed that I would have ID pharmacist and ID  reach out to him to also help with his care, insurance coverage, and medication issues. Patient amenable to this and expresses desire to start ART as soon as feasible. He is also agreeable to getting full set of labs drawn today (has lab appointment at 3:30 pm today scheduled). He is amenable to follow up in ID clinic once on ART for monitoring.        HIV past medical history:  Diagnosed: 5/2025  Approximate date of transmission: unknown  Risk factors: MSM  Jean CD4: pending  Viral load at time of diagnosis:  Genotypes/mutation history:  Treatment history:  Prior OIs:  unknown  TB screening:  never diagnosed, last screened negative when incarcerated 2015, no known sick contacts     ART Adherence:  Current regimen:  n/a  Missed doses in last week, month:  Estimates adherence at:  Medication side effects:  OTC medications:    Social history:  Background: Originally from 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  Otherwise as per HPI    PAST SURGICAL HISTORY  No past surgical history on file.  Otherwise as per HPI    ALLERGIES AND DRUG REACTIONS  Allergies   Allergen Reactions     Peanut-Containing Drug Products Anaphylaxis       FAMILY HISTORY  No known immunocompromising conditions or infections unless listed below  family history is not on file.    SOCIAL AND FUNCTIONAL HISTORY  Social History     Tobacco Use     Smoking status: Every Day     Current packs/day: 1.00     Types: Cigarettes   Vaping Use     Vaping status: Every Day     Otherwise as per HPI    CURRENT ANTIMICROBIALS  Other medications reviewed in [place]  N/a    REVIEW OF SYSTEMS  ROS  as above.    Objective   PHYSICAL EXAMINATION  /83   Pulse 98   Wt 67.6 kg (149 lb)   SpO2 98%   BMI 20.86 kg/m      Constitutional:  appearance not in acute distress   Eyes: no conjunctival injection, no scleral icterus  ENT: no nasal discharge, membranes moist, oropharynx pink and without exudates or thrush  Cardiovascular: regular rate and rhythm  Pulmonary: unlabored breathing, clear to ascultation bilaterally  Gastrointestinal: abdomen non-distended    Musculoskeletal: normal bulk and tone  Lymphatic: no significant cervical LAD   Skin: no acute rashes visible on exposed skin  Neurologic: awake, alert and interactive      Other Significant Information (Labs, cultures, radiology, etc)    Recent micro reviewed:    5/19/25 HIV ag/ab: positive  5/19/25 HIV supplemental assay: positive HIV-1  5/19/25 RPR 1:2    Again, thank you for allowing me to participate in the care of your patient.      Sincerely,    Laz Taveras MD

## 2025-06-18 ENCOUNTER — PATIENT OUTREACH (OUTPATIENT)
Dept: INFECTIOUS DISEASES | Facility: CLINIC | Age: 35
End: 2025-06-18
Payer: MEDICAID

## 2025-06-18 LAB
C TRACH DNA SPEC QL NAA+PROBE: NEGATIVE
CD3 CELLS # BLD: 2244 CELLS/UL (ref 603–2990)
CD3 CELLS NFR BLD: 85 % (ref 49–84)
CD3+CD4+ CELLS # BLD: 935 CELLS/UL (ref 441–2156)
CD3+CD4+ CELLS NFR BLD: 35 % (ref 28–63)
CD3+CD4+ CELLS/CD3+CD8+ CLL BLD: 0.74 % (ref 1.4–2.6)
CD3+CD8+ CELLS # BLD: 1267 CELLS/UL (ref 125–1312)
CD3+CD8+ CELLS NFR BLD: 48 % (ref 10–40)
CMV IGG SERPL IA-ACNC: >10 U/ML
CMV IGG SERPL IA-ACNC: ABNORMAL
G6PD RBC-CCNT: 10.2 U/G HB
HIV1 RNA # PLAS NAA DL=20: ABNORMAL COPIES/ML (ref ?–1)
HIV1 RNA SERPL NAA+PROBE-LOG#: 5.4 {LOG_COPIES}/ML
N GONORRHOEA DNA SPEC QL NAA+PROBE: NEGATIVE
RPR SER QL: REACTIVE
RPR SER-TITR: NORMAL {TITER}
RUBV IGG SERPL QL IA: 1.96 INDEX
RUBV IGG SERPL QL IA: POSITIVE
SPECIMEN TYPE: NORMAL
T CELL COMMENT: ABNORMAL
T GONDII IGG SER QL: <3 IU/ML (ref 0–7.1)

## 2025-06-18 NOTE — TELEPHONE ENCOUNTER
Social Work - Telephone  [facility]  Data: 2025  Patient Name: Luis Fernando Martinez  Goes By: Luis Fernando LEAL/Age: 1990 (35 year old)      Referral Source: Provider    Reason for Referral: Insurance/Medication Access     Intervention: Writer called to follow up about insurance and medication access following Patient's initial visit with ID Clinic. Spoke to Patient who reports he doesn't think he has insurance however was able to fill his meds with no copay at the pharmacy following his visit. Patient reports that the [facility] is still waiting on his verifications, but that has not been a priority to send them in. Writer encouraged Patient to send them in as soon as he is able. Writer checked with pharmacy and it shows he has a medicaid ID.   Plan: Patient will send in verifications to the [facility] to complete insurance process. Patient will notify clinic if he has challenges filling medication or insurance questions in the future. Patient has scheduled follow up with Dr. Taveras on .       MACHO Haile, [facility]  Infectious Disease

## 2025-06-19 ENCOUNTER — VIRTUAL VISIT (OUTPATIENT)
Dept: PHARMACY | Facility: CLINIC | Age: 35
End: 2025-06-19
Attending: INTERNAL MEDICINE
Payer: MEDICAID

## 2025-06-19 DIAGNOSIS — F32.1 CURRENT MODERATE EPISODE OF MAJOR DEPRESSIVE DISORDER WITHOUT PRIOR EPISODE (H): ICD-10-CM

## 2025-06-19 DIAGNOSIS — J30.1 SEASONAL ALLERGIC RHINITIS DUE TO POLLEN: ICD-10-CM

## 2025-06-19 DIAGNOSIS — J45.990 EXERCISE-INDUCED ASTHMA: Primary | ICD-10-CM

## 2025-06-19 DIAGNOSIS — R52 PAIN: ICD-10-CM

## 2025-06-19 DIAGNOSIS — K21.9 GASTROESOPHAGEAL REFLUX DISEASE, UNSPECIFIED WHETHER ESOPHAGITIS PRESENT: ICD-10-CM

## 2025-06-19 DIAGNOSIS — F41.1 GAD (GENERALIZED ANXIETY DISORDER): ICD-10-CM

## 2025-06-19 DIAGNOSIS — B20 HUMAN IMMUNODEFICIENCY VIRUS (HIV) DISEASE (H): ICD-10-CM

## 2025-06-19 LAB
GAMMA INTERFERON BACKGROUND BLD IA-ACNC: 0.09 IU/ML
M TB IFN-G BLD-IMP: NEGATIVE
M TB IFN-G CD4+ BCKGRND COR BLD-ACNC: 9.91 IU/ML
MITOGEN IGNF BCKGRD COR BLD-ACNC: 0.03 IU/ML
MITOGEN IGNF BCKGRD COR BLD-ACNC: 0.03 IU/ML
QUANTIFERON MITOGEN: 10 IU/ML
QUANTIFERON NIL TUBE: 0.09 IU/ML
QUANTIFERON TB1 TUBE: 0.12 IU/ML
QUANTIFERON TB2 TUBE: 0.12

## 2025-06-19 RX ORDER — CALCIUM CARBONATE 500 MG/1
1-3 TABLET, CHEWABLE ORAL PRN
COMMUNITY

## 2025-06-19 NOTE — Clinical Note
Hi Dr. Taveras,  I had the pleasure of meeting your patient today and review medications. He has started on Biktarvy and tolerating well. I advised patient to have labs rechecked in 4 weeks to monitor for safety and efficacy.  Side note - he wanted to restart on sertraline so I will reach out to his PCP to check in. I am planning on sending a [employer] coupon for his sertraline prescription since he does not have any coverage for it at this time.  Thanks!  Oj Sigala, PharmD, BCGP Medication Therapy Management Pharmacist [facility] Infectious Disease Clinic    Pls offer appt few weeks to assess gout  Me or wiley

## 2025-06-19 NOTE — Clinical Note
Hi Dr. Ramirez,  I had the pleasure of meeting your patient today and review medications. He has an extensive/complex BH hx which includes cocaine and meth use. Patient was interested in restarting sertraline so I was hoping to check in to see if that would be okay? I will send out a GoodRx coupon for patient to use as well.  Thanks,  Oj Sigala, PharmD, BCGP Medication Therapy Management Pharmacist [facility] Infectious Disease Clinic

## 2025-06-19 NOTE — PROGRESS NOTES
Medication Therapy Management (MTM) Encounter    ASSESSMENT:                            Medication Adherence/Access: See below for considerations.    HIV:   Patient has restarted antiretroviral therapy and would benefit continuing Biktarvy unless lab work dictates otherwise. He is tolerating well and encouraged patient to recheck labs middle/late July to assess for safety and efficacy while on Biktarvy.          Allergy:   Stable        Asthma:   Stable        GERD:   Patient only takes Tums as needed, but has omeprazole available. Discussed omeprazole may be helpful for managing GERD-related symptoms if he feels Tums is ineffective        Mental Health   Anxiety and Depression:   Complex and extensive hx of drug abused which includes cocaine and methamphetamine. Given that mood has been poorly managed, he would benefit from restarting his SSRI. Advised against starting both sertraline and bupropion at the same time since it may make it more difficulty to track side effects and overall efficacy. Will send GoodRx coupon to local [facility] for patient to fill - we reviewed potential cost and patient expressed understanding.         Pain:   Reviewed risks with chronic ibuprofen use and patient expressed understanding. Advised that ibuprofen can also exacerbate GERD symptoms.       PLAN:                              Refill your sertraline and restart 100 mg once daily  MTM pharmacist will discuss provider about this recommendation   MTM Pharmacist to send GoodRx coupon to your [facility] to use  Please separate vitamins and Tums from Biktarvy   You can take Biktarvy doses 2 hours before or 6 hour afterwards  Schedule HIV labs to check on your viral load and other lab work      Follow-up: MTM pharmacist in 4 weeks, ID provider 7/30/25      SUBJECTIVE/OBJECTIVE:                          Luis Fernando Martinez is a 35 year old male seen for an initial visit. He was referred to me from Laz Alvarado.      Reason for  visit: HIV Management.    Allergies/ADRs: Reviewed in chart  Past Medical History: Reviewed in chart  Tobacco: He reports that he has been smoking cigarettes. He does not have any smokeless tobacco history on file.Nicotine/Tobacco Cessation Plan  Has tried gum and patch in the past, but notes it was more effective when sober from meth.  Alcohol: 1-3 beverages / week  Will use 50$ of methamphetamine on a weekly basis, previous cocaine use    Medication Adherence/Access: has been out of his medications for some time due to lapse in insurance, but is now enrolled with Program  so could fill his Biktarvy.  The patient fills medications at [facility]: NO, fills medications at [facility].      HIV:   Biktarvy -25 mg once daily     Side effects: tolerating well, started 6/17/25   Diagnosis: 05/2025  Past regimens:   Truvada for PrEP    Phenotype:   In process as of 6/17/25    HIV VL: 272,000 (6/17/25)  CD4: 935 (6/17/25)  Chlamydia: negative (6/17/25)  Gonorrhea: negative (6/17/25)  Treponema Ab: reactive (5/19/25)  Hep B: negative HBsAg, HBsAb, and HBcAb - not immune (6/17/25)  Hep C: nonreactive (6/17/25)    Immunizations:  Flu (up to date), COVID (up to date), Tdap (up to date), PCV20 (up to date), HepB (only childhood vaccines), HepA (unknown), HPV (due), Shingrix (due), MenACWY (due), Jynneos (due)          Allergy   Loratadine 10 mg once daily as needed   Flonase (fluticasone) nasal spray - 2 spray(s) each nostril once daily as needed   Epi-Pen 0.3 mg as needed for anaphylactic emergencies  Patient reports no current medication side effects other than mild drowsiness with loratadine.  Patient feels that current therapy is effective.        Asthma   Albuterol 108 mcg inhaler 2 puffs every 4 hours as needed   Will use for exercise induced asthma. Patient reports no current medication side effects.           GERD    Omeprazole 20 mg once daily   Tums 500-1500 mg as needed   Patient feels that current regimen is  somewhat effective, but symptoms worsens when he smokes cigarettes. Does not take PPI at this time. No reported issues at this time.        Mental Health   Anxiety and Depression  Bupropion 150 mg XL once daily - not taking at this time  Buspirone 5 mg once daily - not taking at this time  Sertraline 100 mg once daily - not taking at this time  Prazosin 2 mg at bedtime - not taking at this time  Has not been able to take sertraline for the past month and struggles with meth addiction. He was in a relationship for 8 years, but relationship ended January 2025. He is also . Established with outpatient services for addiction in the past. Denies SI at this time.  Patient reports symptoms are unchanged.  Patient is not seeing a therapist.   Patient is not seeing a psychiatrist.       Pain   Acetaminophen 500-1000 mg every 6 hrs as needed   Ibuprofen 600 mg every 6 hours as needed   States he will use ibuprofen more than Tylenol as it is more effective. No reported issues at this time, denies any blood in urine or black tarry stools.              Today's Vitals: There were no vitals taken for this visit.  ----------------      I spent 25 minutes with this patient today. All changes were made via collaborative practice agreement with Laz Taveras.     A summary of these recommendations was sent via [employer].      Oj Sigala, PharmD, BCGP  Medication Therapy Management Pharmacist  [facility]       Telemedicine Visit Details  The patient's medications can be safely assessed via a telemedicine encounter.  Type of service:  Video Conference via [employer]  Originating Location (pt. Location): Home    Distant Location (provider location):  On-site  Start Time: 11:02 AM  End Time: 11:27 AM     Medication Therapy Recommendations  Current moderate episode of major depressive disorder without prior episode (H)   1 Current Medication: sertraline (ZOLOFT) 100 MG tablet   Current Medication Sig: Take 1 tablet  (100 mg) by mouth daily.   Rationale: Untreated condition - Needs additional medication therapy - Indication   Recommendation: Start Medication   Status: Contact Provider - Awaiting Response   Identified Date: 6/19/2025

## 2025-06-19 NOTE — PATIENT INSTRUCTIONS
"Recommendations from today's MTM visit:                                                    MTM (medication therapy management) is a service provided by a clinical pharmacist designed to help you get the most of out of your medicines.   Today we reviewed what your medicines are for, how to know if they are working, that your medicines are safe and how to make your medicine regimen as easy as possible.      Refill your sertraline and restart 100 mg once daily  MTM Pharmacist to send [facility] to [facility]  Please separate vitamins and Tums from Biktarvy   You can take Biktarvy doses 2 hours before or 6 hour afterwards  Schedule HIV labs to check on your viral load and other lab work      Follow-up: MTM pharmacist in 4 weeks, ID provider 7/30/25    It was great speaking with you today.  I value your experience and would be very thankful for your time in providing feedback in our clinic survey. In the next few days, you may receive an email or text message from [employer] with a link to a survey related to your  clinical pharmacist.\"     To schedule another MTM appointment, please call the clinic directly or you may call the MTM scheduling line at [phone] or toll-free at [phone].     My Clinical Pharmacist's contact information:                                                      Please feel free to contact me with any questions or concerns you have.      Oj Sigala, PharmD, [facility]  Medication Therapy Management Pharmacist  [facility]    "

## 2025-07-15 LAB — PATHOLOGY STUDY: NORMAL

## 2025-07-16 NOTE — PROGRESS NOTES
Medication Therapy Management (MTM) Encounter    ASSESSMENT:                            Medication Adherence/Access: See below for considerations.    HIV:   Patient has restarted antiretroviral therapy and would benefit continuing Biktarvy unless lab work dictates otherwise. He is tolerating well and helped patient schedule repeat HIV lab work to evaluate efficacy/safety.      Mental Health   Anxiety and Depression:   Stable. Advised patient to continue with sertraline and to establish care with psych services at [place] since he is already actively filling medications there. Should not start bupropion and buspirone at this time as he should be under supervision with a mental health provider.           PLAN:                              Continue with Biktarvy once daily   Continue with sertraline once daily   Do not start bupropion or buspirone until you meet with a psychiatrist or original prescribing doctor   Please go to your lab appointment on 7/21/25 at 2:15 pm to recheck your HIV labs   your Biktarvy prescription while you are at the clinic - I spoke with pharmacy to make sure they have the refill available for you by then      Follow-up: MTM pharmacist in 2-3 months (will schedule via [employer]), ID provider 7/30/25      SUBJECTIVE/OBJECTIVE:                          Luis Fernando Martinez is a 35 year old male seen for a follow up visit.      Reason for visit: HIV Management.    Allergies/ADRs: Reviewed in chart  Past Medical History: Reviewed in chart  Tobacco: He reports that he has been smoking cigarettes. He does not have any smokeless tobacco history on file.Nicotine/Tobacco Cessation Plan  Has tried gum and patch in the past, but notes it was more effective when sober from meth.  Alcohol: 1-3 beverages / week  Will use 50$ of methamphetamine on a weekly basis, previous cocaine use    Medication Adherence/Access: Reports 1-3x missed doses in past month since restart. Fills HIV at [facility] and  psych meds at [facility]. States he got a new insurance card 2 weeks ago although uncertain if it belongs to him or his father since they both have the same name.      HIV:   Biktarvy -25 mg once daily     Side effects: tolerating well, started 6/17/25, and has noticed increased energy level  Diagnosis: 05/2025  Past regimens:   Truvada for PrEP    Phenotype: (7/15/25)  Partially sensitive: Tipranaivir/r   Resistance: Nelfinavir  NRTI: no mutations  NNRTI: I135T, V179I  PI: I13V, A71T      HIV VL: 272,000 (6/17/25)  CD4: 935 (6/17/25)  Chlamydia: negative (6/17/25)  Gonorrhea: negative (6/17/25)  Treponema Ab: reactive (5/19/25)  Hep B: negative HBsAg, HBsAb, and HBcAb - not immune (6/17/25)  Hep C: nonreactive (6/17/25)    Immunizations:  Flu (up to date), COVID (up to date), Tdap (up to date), PCV20 (up to date), HepB (only childhood vaccines), HepA (unknown), HPV (due), Shingrix (due), MenACWY (due), Jynneos (due)           Mental Health   Anxiety and Depression  Bupropion 150 mg XL once daily - has not started, but filled recently  Buspirone 5 mg once daily - has not started, but filled recently  Sertraline 100 mg once daily  Prazosin 2 mg at bedtime - not taking at this time  States mood has been improved with less irritability since restarting sertraline. Has been able to  buspirone and bupropion, but did not start.  No reported issues at this time.  Prior hx of  illicit drug use and addiction, specifically meth. He was in a relationship for 8 years, but relationship ended January 2025. He is also established with outpatient services for addiction in the past. Patient is not seeing a therapist or psychiatrist, but fills mental health medications a [place].             Today's Vitals: There were no vitals taken for this visit.  ----------------      I spent 14 minutes with this patient today. All changes were made via collaborative practice agreement with Laz Alvarado.     A summary of these  recommendations was sent via [employer].      Oj Sigala, PharmD, BCGP  Medication Therapy Management Pharmacist  [facility]       Telemedicine Visit Details  The patient's medications can be safely assessed via a telemedicine encounter.  Type of service:  Telephone visit  Originating Location (pt. Location): Other driving in his car    Distant Location (provider location):  On-site  Start Time: 11:07 AM  End Time: 11:21 AM     Medication Therapy Recommendations  Current moderate episode of major depressive disorder without prior episode (H)   1 Current Medication: sertraline (ZOLOFT) 100 MG tablet   Current Medication Sig: Take 1 tablet (100 mg) by mouth daily.   Rationale: Untreated condition - Needs additional medication therapy - Indication   Recommendation: Start Medication   Status: Accepted per Provider   Identified Date: 6/19/2025 Completed Date: 7/17/2025         Human immunodeficiency virus (HIV) disease (H)   1 Current Medication: bictegravir-emtricitabine-tenofovir (BIKTARVY) -25 MG per tablet   Current Medication Sig: Take 1 tablet by mouth daily.   Rationale: Medication requires monitoring - Needs additional monitoring - Safety   Recommendation: Order Lab   Status: Accepted per CPA   Identified Date: 7/17/2025 Completed Date: 7/17/2025

## 2025-07-17 ENCOUNTER — VIRTUAL VISIT (OUTPATIENT)
Dept: PHARMACY | Facility: CLINIC | Age: 35
End: 2025-07-17
Payer: MEDICAID

## 2025-07-17 DIAGNOSIS — B20 HUMAN IMMUNODEFICIENCY VIRUS (HIV) DISEASE (H): ICD-10-CM

## 2025-07-17 DIAGNOSIS — F41.1 GAD (GENERALIZED ANXIETY DISORDER): Primary | ICD-10-CM

## 2025-07-17 DIAGNOSIS — F32.1 CURRENT MODERATE EPISODE OF MAJOR DEPRESSIVE DISORDER WITHOUT PRIOR EPISODE (H): ICD-10-CM

## 2025-07-17 NOTE — Clinical Note
Hi Dr. Taveras,  I scheduled repeat HIV lab work for patient after his recent Biktarvy start. He is tolerating Biktarvy well and feels that mood has been better with sertraline restart. No recommendations from my end today.  Thanks,  Oj Sigala, PharmD, BCGP Medication Therapy Management Pharmacist [facility]

## 2025-07-17 NOTE — PATIENT INSTRUCTIONS
"Recommendations from today's MTM visit:                                                    MTM (medication therapy management) is a service provided by a clinical pharmacist designed to help you get the most of out of your medicines.   Today we reviewed what your medicines are for, how to know if they are working, that your medicines are safe and how to make your medicine regimen as easy as possible.      Continue with Biktarvy once daily   Continue with sertraline once daily   Do not start bupropion or buspirone until you meet with a psychiatrist or original prescribing doctor   Please go to your lab appointment on 7/21/25 at 2:15 pm to recheck your HIV labs   your Biktarvy prescription while you are at the clinic - I spoke with pharmacy to make sure they have the refill available for you by then      Follow-up: MTM pharmacist in 2-3 months (will schedule via [employer]), ID provider 7/30/25    It was great speaking with you today.  I value your experience and would be very thankful for your time in providing feedback in our clinic survey. In the next few days, you may receive an email or text message from [employer] with a link to a survey related to your  clinical pharmacist.\"     To schedule another MTM appointment, please call the clinic directly or you may call the MTM scheduling line at [phone] or toll-free at [phone].     My Clinical Pharmacist's contact information:                                                      Please feel free to contact me with any questions or concerns you have.      Oj Sigala, PharmD, BCGP  Medication Therapy Management Pharmacist  [facility]    "

## 2025-07-21 ENCOUNTER — LAB (OUTPATIENT)
Dept: LAB | Facility: CLINIC | Age: 35
End: 2025-07-21
Payer: COMMERCIAL

## 2025-07-21 DIAGNOSIS — Z21 HIV-1 INFECTION (H): ICD-10-CM

## 2025-07-21 DIAGNOSIS — Z21 ASYMPTOMATIC HUMAN IMMUNODEFICIENCY VIRUS (HIV) INFECTION STATUS (H): ICD-10-CM

## 2025-07-21 LAB
ALBUMIN SERPL BCG-MCNC: 4.3 G/DL (ref 3.5–5.2)
ALBUMIN UR-MCNC: NEGATIVE MG/DL
ALP SERPL-CCNC: 103 U/L (ref 40–150)
ALT SERPL W P-5'-P-CCNC: 11 U/L (ref 0–70)
ANION GAP SERPL CALCULATED.3IONS-SCNC: 11 MMOL/L (ref 7–15)
APPEARANCE UR: CLEAR
AST SERPL W P-5'-P-CCNC: 15 U/L (ref 0–45)
BILIRUB SERPL-MCNC: 0.3 MG/DL
BILIRUB UR QL STRIP: NEGATIVE
BUN SERPL-MCNC: 11.1 MG/DL (ref 6–20)
CALCIUM SERPL-MCNC: 9.6 MG/DL (ref 8.8–10.4)
CD3 CELLS # BLD: 2369 CELLS/UL (ref 603–2990)
CD3 CELLS NFR BLD: 82 % (ref 49–84)
CD3+CD4+ CELLS # BLD: 1122 CELLS/UL (ref 441–2156)
CD3+CD4+ CELLS NFR BLD: 39 % (ref 28–63)
CD3+CD4+ CELLS/CD3+CD8+ CLL BLD: 0.94 % (ref 1.4–2.6)
CD3+CD8+ CELLS # BLD: 1197 CELLS/UL (ref 125–1312)
CD3+CD8+ CELLS NFR BLD: 41 % (ref 10–40)
CHLORIDE SERPL-SCNC: 105 MMOL/L (ref 98–107)
COLOR UR AUTO: ABNORMAL
CREAT SERPL-MCNC: 0.88 MG/DL (ref 0.67–1.17)
EGFRCR SERPLBLD CKD-EPI 2021: >90 ML/MIN/1.73M2
ERYTHROCYTE [DISTWIDTH] IN BLOOD BY AUTOMATED COUNT: 14.7 % (ref 10–15)
GLUCOSE SERPL-MCNC: 96 MG/DL (ref 70–99)
GLUCOSE UR STRIP-MCNC: NEGATIVE MG/DL
HCO3 SERPL-SCNC: 23 MMOL/L (ref 22–29)
HCT VFR BLD AUTO: 43.5 % (ref 40–53)
HGB BLD-MCNC: 14.6 G/DL (ref 13.3–17.7)
HGB UR QL STRIP: NEGATIVE
KETONES UR STRIP-MCNC: NEGATIVE MG/DL
LEUKOCYTE ESTERASE UR QL STRIP: NEGATIVE
MCH RBC QN AUTO: 28 PG (ref 26.5–33)
MCHC RBC AUTO-ENTMCNC: 33.6 G/DL (ref 31.5–36.5)
MCV RBC AUTO: 84 FL (ref 78–100)
MUCOUS THREADS #/AREA URNS LPF: PRESENT /LPF
NITRATE UR QL: NEGATIVE
PH UR STRIP: 6 [PH] (ref 5–7)
PLATELET # BLD AUTO: 273 10E3/UL (ref 150–450)
POTASSIUM SERPL-SCNC: 4.3 MMOL/L (ref 3.4–5.3)
PROT SERPL-MCNC: 7.3 G/DL (ref 6.4–8.3)
RBC # BLD AUTO: 5.21 10E6/UL (ref 4.4–5.9)
RBC URINE: <1 /HPF
SODIUM SERPL-SCNC: 139 MMOL/L (ref 135–145)
SP GR UR STRIP: 1.02 (ref 1–1.03)
SQUAMOUS EPITHELIAL: 2 /HPF
T CELL COMMENT: ABNORMAL
UROBILINOGEN UR STRIP-MCNC: NORMAL MG/DL
WBC # BLD AUTO: 8.4 10E3/UL (ref 4–11)
WBC URINE: 1 /HPF

## 2025-07-21 PROCEDURE — 85027 COMPLETE CBC AUTOMATED: CPT | Performed by: PATHOLOGY

## 2025-07-21 PROCEDURE — 36415 COLL VENOUS BLD VENIPUNCTURE: CPT | Performed by: PATHOLOGY

## 2025-07-21 PROCEDURE — 86359 T CELLS TOTAL COUNT: CPT | Performed by: STUDENT IN AN ORGANIZED HEALTH CARE EDUCATION/TRAINING PROGRAM

## 2025-07-21 PROCEDURE — 99000 SPECIMEN HANDLING OFFICE-LAB: CPT | Performed by: PATHOLOGY

## 2025-07-21 PROCEDURE — 81001 URINALYSIS AUTO W/SCOPE: CPT | Performed by: PATHOLOGY

## 2025-07-21 PROCEDURE — 87536 HIV-1 QUANT&REVRSE TRNSCRPJ: CPT | Performed by: INTERNAL MEDICINE

## 2025-07-21 PROCEDURE — 80053 COMPREHEN METABOLIC PANEL: CPT | Performed by: PATHOLOGY

## 2025-07-22 LAB
HIV1 RNA # PLAS NAA DL=20: 21 COPIES/ML (ref ?–1)
HIV1 RNA SERPL NAA+PROBE-LOG#: 1.3 {LOG_COPIES}/ML

## 2025-07-30 ENCOUNTER — PATIENT OUTREACH (OUTPATIENT)
Dept: INFECTIOUS DISEASES | Facility: CLINIC | Age: 35
End: 2025-07-30

## 2025-07-30 NOTE — TELEPHONE ENCOUNTER
Social Work - Telephone/mindSHIFT Technologieshart message  [facility]  Data: 2025  Patient Name: Luis Fernando Martinez  Goes By: Luis Fernando LEAL/Age: 1990 (35 year old)      Reason for Referral: Missed Appointment/Check In     Intervention: Writer called Patient to follow up regarding missed appointment, address potential barriers to care, and encourage reschedule. Vm left.   Plan:  will await patient's return phone call/message and provide assistance at that time.     MACHO Haile, [facility]  Infectious Disease